# Patient Record
Sex: FEMALE | Race: WHITE | NOT HISPANIC OR LATINO | URBAN - METROPOLITAN AREA
[De-identification: names, ages, dates, MRNs, and addresses within clinical notes are randomized per-mention and may not be internally consistent; named-entity substitution may affect disease eponyms.]

---

## 2017-04-12 PROBLEM — Z00.00 ENCOUNTER FOR PREVENTIVE HEALTH EXAMINATION: Status: ACTIVE | Noted: 2017-04-12

## 2017-05-04 ENCOUNTER — INPATIENT (INPATIENT)
Facility: HOSPITAL | Age: 82
LOS: 4 days | Discharge: NURSING, REHAB OR LONG-TERM CARE | DRG: 329 | End: 2017-05-09
Attending: SURGERY | Admitting: SURGERY
Payer: COMMERCIAL

## 2017-05-04 ENCOUNTER — RESULT REVIEW (OUTPATIENT)
Age: 82
End: 2017-05-04

## 2017-05-04 VITALS
TEMPERATURE: 99 F | HEART RATE: 96 BPM | DIASTOLIC BLOOD PRESSURE: 76 MMHG | OXYGEN SATURATION: 97 % | WEIGHT: 4.41 LBS | SYSTOLIC BLOOD PRESSURE: 149 MMHG | RESPIRATION RATE: 18 BRPM

## 2017-05-04 DIAGNOSIS — Z90.721 ACQUIRED ABSENCE OF OVARIES, UNILATERAL: Chronic | ICD-10-CM

## 2017-05-04 DIAGNOSIS — Z90.49 ACQUIRED ABSENCE OF OTHER SPECIFIED PARTS OF DIGESTIVE TRACT: Chronic | ICD-10-CM

## 2017-05-04 LAB
ALBUMIN SERPL ELPH-MCNC: 3.5 G/DL — SIGNIFICANT CHANGE UP (ref 3.4–5)
ALP SERPL-CCNC: 81 U/L — SIGNIFICANT CHANGE UP (ref 40–120)
ALT FLD-CCNC: 27 U/L — SIGNIFICANT CHANGE UP (ref 12–42)
ANION GAP SERPL CALC-SCNC: 7 MMOL/L — LOW (ref 9–16)
ANION GAP SERPL CALC-SCNC: 9 MMOL/L — SIGNIFICANT CHANGE UP (ref 9–16)
APPEARANCE UR: CLEAR — SIGNIFICANT CHANGE UP
APTT BLD: 29.4 SEC — SIGNIFICANT CHANGE UP (ref 27.5–37.4)
APTT BLD: 31.8 SEC — SIGNIFICANT CHANGE UP (ref 27.5–37.4)
AST SERPL-CCNC: 17 U/L — SIGNIFICANT CHANGE UP (ref 15–37)
BACTERIA # UR AUTO: PRESENT /HPF
BASOPHILS NFR BLD AUTO: 0.4 % — SIGNIFICANT CHANGE UP (ref 0–2)
BILIRUB SERPL-MCNC: 1.1 MG/DL — SIGNIFICANT CHANGE UP (ref 0.2–1.2)
BILIRUB UR-MCNC: NEGATIVE — SIGNIFICANT CHANGE UP
BLD GP AB SCN SERPL QL: NEGATIVE — SIGNIFICANT CHANGE UP
BLD GP AB SCN SERPL QL: NEGATIVE — SIGNIFICANT CHANGE UP
BUN SERPL-MCNC: 14 MG/DL — SIGNIFICANT CHANGE UP (ref 7–23)
BUN SERPL-MCNC: 16 MG/DL — SIGNIFICANT CHANGE UP (ref 7–23)
CALCIUM SERPL-MCNC: 7.9 MG/DL — LOW (ref 8.5–10.5)
CALCIUM SERPL-MCNC: 8.9 MG/DL — SIGNIFICANT CHANGE UP (ref 8.5–10.5)
CHLORIDE SERPL-SCNC: 103 MMOL/L — SIGNIFICANT CHANGE UP (ref 96–108)
CHLORIDE SERPL-SCNC: 105 MMOL/L — SIGNIFICANT CHANGE UP (ref 96–108)
CO2 SERPL-SCNC: 24 MMOL/L — SIGNIFICANT CHANGE UP (ref 22–31)
CO2 SERPL-SCNC: 27 MMOL/L — SIGNIFICANT CHANGE UP (ref 22–31)
COLOR SPEC: YELLOW — SIGNIFICANT CHANGE UP
COMMENT - URINE: SIGNIFICANT CHANGE UP
CREAT SERPL-MCNC: 0.82 MG/DL — SIGNIFICANT CHANGE UP (ref 0.5–1.3)
CREAT SERPL-MCNC: 0.92 MG/DL — SIGNIFICANT CHANGE UP (ref 0.5–1.3)
DIFF PNL FLD: (no result)
EOSINOPHIL NFR BLD AUTO: 2.1 % — SIGNIFICANT CHANGE UP (ref 0–6)
EPI CELLS # UR: (no result) /HPF
GLUCOSE SERPL-MCNC: 109 MG/DL — HIGH (ref 70–99)
GLUCOSE SERPL-MCNC: 158 MG/DL — HIGH (ref 70–99)
GLUCOSE UR QL: NEGATIVE — SIGNIFICANT CHANGE UP
HCT VFR BLD CALC: 43.7 % — SIGNIFICANT CHANGE UP (ref 34.5–45)
HCT VFR BLD CALC: 44 % — SIGNIFICANT CHANGE UP (ref 34.5–45)
HGB BLD-MCNC: 14.2 G/DL — SIGNIFICANT CHANGE UP (ref 11.5–15.5)
HGB BLD-MCNC: 14.7 G/DL — SIGNIFICANT CHANGE UP (ref 11.5–15.5)
INR BLD: 1.14 — SIGNIFICANT CHANGE UP (ref 0.88–1.16)
INR BLD: 1.14 — SIGNIFICANT CHANGE UP (ref 0.88–1.16)
KETONES UR-MCNC: NEGATIVE — SIGNIFICANT CHANGE UP
LEUKOCYTE ESTERASE UR-ACNC: NEGATIVE — SIGNIFICANT CHANGE UP
LIDOCAIN IGE QN: 107 U/L — SIGNIFICANT CHANGE UP (ref 73–393)
LYMPHOCYTES # BLD AUTO: 20 % — SIGNIFICANT CHANGE UP (ref 13–44)
MAGNESIUM SERPL-MCNC: 1.7 MG/DL — SIGNIFICANT CHANGE UP (ref 1.6–2.4)
MCHC RBC-ENTMCNC: 28.9 PG — SIGNIFICANT CHANGE UP (ref 27–34)
MCHC RBC-ENTMCNC: 29.3 PG — SIGNIFICANT CHANGE UP (ref 27–34)
MCHC RBC-ENTMCNC: 32.5 G/DL — SIGNIFICANT CHANGE UP (ref 32–36)
MCHC RBC-ENTMCNC: 33.4 G/DL — SIGNIFICANT CHANGE UP (ref 32–36)
MCV RBC AUTO: 87.8 FL — SIGNIFICANT CHANGE UP (ref 80–100)
MCV RBC AUTO: 88.8 FL — SIGNIFICANT CHANGE UP (ref 80–100)
MONOCYTES NFR BLD AUTO: 9.2 % — SIGNIFICANT CHANGE UP (ref 2–14)
NEUTROPHILS NFR BLD AUTO: 68.3 % — SIGNIFICANT CHANGE UP (ref 43–77)
NITRITE UR-MCNC: NEGATIVE — SIGNIFICANT CHANGE UP
PH UR: 6 — SIGNIFICANT CHANGE UP (ref 5–8)
PHOSPHATE SERPL-MCNC: 3.7 MG/DL — SIGNIFICANT CHANGE UP (ref 2.5–4.5)
PLATELET # BLD AUTO: 284 K/UL — SIGNIFICANT CHANGE UP (ref 150–400)
PLATELET # BLD AUTO: 291 K/UL — SIGNIFICANT CHANGE UP (ref 150–400)
POTASSIUM SERPL-MCNC: 4 MMOL/L — SIGNIFICANT CHANGE UP (ref 3.5–5.3)
POTASSIUM SERPL-MCNC: 4.1 MMOL/L — SIGNIFICANT CHANGE UP (ref 3.5–5.3)
POTASSIUM SERPL-SCNC: 4 MMOL/L — SIGNIFICANT CHANGE UP (ref 3.5–5.3)
POTASSIUM SERPL-SCNC: 4.1 MMOL/L — SIGNIFICANT CHANGE UP (ref 3.5–5.3)
PROT SERPL-MCNC: 7.8 G/DL — SIGNIFICANT CHANGE UP (ref 6.4–8.2)
PROT UR-MCNC: 100 MG/DL
PROTHROM AB SERPL-ACNC: 12.7 SEC — SIGNIFICANT CHANGE UP (ref 9.8–12.7)
PROTHROM AB SERPL-ACNC: 12.7 SEC — SIGNIFICANT CHANGE UP (ref 9.8–12.7)
RBC # BLD: 4.92 M/UL — SIGNIFICANT CHANGE UP (ref 3.8–5.2)
RBC # BLD: 5.01 M/UL — SIGNIFICANT CHANGE UP (ref 3.8–5.2)
RBC # FLD: 14.5 % — SIGNIFICANT CHANGE UP (ref 10.3–16.9)
RBC # FLD: 14.8 % — SIGNIFICANT CHANGE UP (ref 10.3–16.9)
RBC CASTS # UR COMP ASSIST: < 5 /HPF — SIGNIFICANT CHANGE UP
RH IG SCN BLD-IMP: POSITIVE — SIGNIFICANT CHANGE UP
RH IG SCN BLD-IMP: POSITIVE — SIGNIFICANT CHANGE UP
SODIUM SERPL-SCNC: 136 MMOL/L — SIGNIFICANT CHANGE UP (ref 135–145)
SODIUM SERPL-SCNC: 139 MMOL/L — SIGNIFICANT CHANGE UP (ref 135–145)
SP GR SPEC: 1.02 — SIGNIFICANT CHANGE UP (ref 1–1.03)
UROBILINOGEN FLD QL: 0.2 E.U./DL — SIGNIFICANT CHANGE UP
WBC # BLD: 10.7 K/UL — HIGH (ref 3.8–10.5)
WBC # BLD: 13.9 K/UL — HIGH (ref 3.8–10.5)
WBC # FLD AUTO: 10.7 K/UL — HIGH (ref 3.8–10.5)
WBC # FLD AUTO: 13.9 K/UL — HIGH (ref 3.8–10.5)
WBC UR QL: > 10 /HPF

## 2017-05-04 PROCEDURE — 93010 ELECTROCARDIOGRAM REPORT: CPT

## 2017-05-04 PROCEDURE — 74022 RADEX COMPL AQT ABD SERIES: CPT | Mod: 26

## 2017-05-04 PROCEDURE — 71010: CPT | Mod: 26,59

## 2017-05-04 PROCEDURE — 74177 CT ABD & PELVIS W/CONTRAST: CPT | Mod: 26

## 2017-05-04 PROCEDURE — 99291 CRITICAL CARE FIRST HOUR: CPT | Mod: 25

## 2017-05-04 PROCEDURE — 74010: CPT | Mod: 26

## 2017-05-04 RX ORDER — SODIUM CHLORIDE 9 MG/ML
1000 INJECTION INTRAMUSCULAR; INTRAVENOUS; SUBCUTANEOUS
Qty: 0 | Refills: 0 | Status: DISCONTINUED | OUTPATIENT
Start: 2017-05-04 | End: 2017-05-04

## 2017-05-04 RX ORDER — DEXTROSE 50 % IN WATER 50 %
1 SYRINGE (ML) INTRAVENOUS ONCE
Qty: 0 | Refills: 0 | Status: DISCONTINUED | OUTPATIENT
Start: 2017-05-04 | End: 2017-05-05

## 2017-05-04 RX ORDER — HEPARIN SODIUM 5000 [USP'U]/ML
7500 INJECTION INTRAVENOUS; SUBCUTANEOUS EVERY 8 HOURS
Qty: 0 | Refills: 0 | Status: DISCONTINUED | OUTPATIENT
Start: 2017-05-04 | End: 2017-05-04

## 2017-05-04 RX ORDER — SODIUM CHLORIDE 9 MG/ML
500 INJECTION INTRAMUSCULAR; INTRAVENOUS; SUBCUTANEOUS ONCE
Qty: 0 | Refills: 0 | Status: COMPLETED | OUTPATIENT
Start: 2017-05-04 | End: 2017-05-05

## 2017-05-04 RX ORDER — CEFOTETAN DISODIUM 1 G
2 VIAL (EA) INJECTION EVERY 12 HOURS
Qty: 0 | Refills: 0 | Status: DISCONTINUED | OUTPATIENT
Start: 2017-05-04 | End: 2017-05-06

## 2017-05-04 RX ORDER — SODIUM CHLORIDE 9 MG/ML
1000 INJECTION, SOLUTION INTRAVENOUS
Qty: 0 | Refills: 0 | Status: DISCONTINUED | OUTPATIENT
Start: 2017-05-04 | End: 2017-05-05

## 2017-05-04 RX ORDER — GLUCAGON INJECTION, SOLUTION 0.5 MG/.1ML
1 INJECTION, SOLUTION SUBCUTANEOUS ONCE
Qty: 0 | Refills: 0 | Status: DISCONTINUED | OUTPATIENT
Start: 2017-05-04 | End: 2017-05-05

## 2017-05-04 RX ORDER — CLONAZEPAM 1 MG
0 TABLET ORAL
Qty: 0 | Refills: 0 | COMMUNITY

## 2017-05-04 RX ORDER — HYDROMORPHONE HYDROCHLORIDE 2 MG/ML
0.5 INJECTION INTRAMUSCULAR; INTRAVENOUS; SUBCUTANEOUS EVERY 4 HOURS
Qty: 0 | Refills: 0 | Status: DISCONTINUED | OUTPATIENT
Start: 2017-05-04 | End: 2017-05-04

## 2017-05-04 RX ORDER — HYDROMORPHONE HYDROCHLORIDE 2 MG/ML
30 INJECTION INTRAMUSCULAR; INTRAVENOUS; SUBCUTANEOUS
Qty: 0 | Refills: 0 | Status: DISCONTINUED | OUTPATIENT
Start: 2017-05-04 | End: 2017-05-07

## 2017-05-04 RX ORDER — ONDANSETRON 8 MG/1
4 TABLET, FILM COATED ORAL EVERY 6 HOURS
Qty: 0 | Refills: 0 | Status: DISCONTINUED | OUTPATIENT
Start: 2017-05-04 | End: 2017-05-04

## 2017-05-04 RX ORDER — HYDROMORPHONE HYDROCHLORIDE 2 MG/ML
0.5 INJECTION INTRAMUSCULAR; INTRAVENOUS; SUBCUTANEOUS ONCE
Qty: 0 | Refills: 0 | Status: DISCONTINUED | OUTPATIENT
Start: 2017-05-04 | End: 2017-05-04

## 2017-05-04 RX ORDER — DULOXETINE HYDROCHLORIDE 30 MG/1
1 CAPSULE, DELAYED RELEASE ORAL
Qty: 0 | Refills: 0 | COMMUNITY

## 2017-05-04 RX ORDER — INSULIN LISPRO 100/ML
VIAL (ML) SUBCUTANEOUS
Qty: 0 | Refills: 0 | Status: DISCONTINUED | OUTPATIENT
Start: 2017-05-04 | End: 2017-05-05

## 2017-05-04 RX ORDER — SODIUM CHLORIDE 9 MG/ML
1000 INJECTION, SOLUTION INTRAVENOUS
Qty: 0 | Refills: 0 | Status: DISCONTINUED | OUTPATIENT
Start: 2017-05-04 | End: 2017-05-04

## 2017-05-04 RX ORDER — IOHEXOL 300 MG/ML
50 INJECTION, SOLUTION INTRAVENOUS ONCE
Qty: 0 | Refills: 0 | Status: COMPLETED | OUTPATIENT
Start: 2017-05-04 | End: 2017-05-04

## 2017-05-04 RX ORDER — ONDANSETRON 8 MG/1
4 TABLET, FILM COATED ORAL ONCE
Qty: 0 | Refills: 0 | Status: COMPLETED | OUTPATIENT
Start: 2017-05-04 | End: 2017-05-04

## 2017-05-04 RX ORDER — DEXTROSE 50 % IN WATER 50 %
25 SYRINGE (ML) INTRAVENOUS ONCE
Qty: 0 | Refills: 0 | Status: DISCONTINUED | OUTPATIENT
Start: 2017-05-04 | End: 2017-05-05

## 2017-05-04 RX ORDER — MAGNESIUM SULFATE 500 MG/ML
2 VIAL (ML) INJECTION ONCE
Qty: 0 | Refills: 0 | Status: COMPLETED | OUTPATIENT
Start: 2017-05-04 | End: 2017-05-04

## 2017-05-04 RX ORDER — SODIUM CHLORIDE 9 MG/ML
500 INJECTION INTRAMUSCULAR; INTRAVENOUS; SUBCUTANEOUS ONCE
Qty: 0 | Refills: 0 | Status: COMPLETED | OUTPATIENT
Start: 2017-05-04 | End: 2017-05-04

## 2017-05-04 RX ORDER — HYDROMORPHONE HYDROCHLORIDE 2 MG/ML
1 INJECTION INTRAMUSCULAR; INTRAVENOUS; SUBCUTANEOUS EVERY 4 HOURS
Qty: 0 | Refills: 0 | Status: DISCONTINUED | OUTPATIENT
Start: 2017-05-04 | End: 2017-05-04

## 2017-05-04 RX ORDER — HEPARIN SODIUM 5000 [USP'U]/ML
7500 INJECTION INTRAVENOUS; SUBCUTANEOUS EVERY 8 HOURS
Qty: 0 | Refills: 0 | Status: DISCONTINUED | OUTPATIENT
Start: 2017-05-04 | End: 2017-05-09

## 2017-05-04 RX ORDER — HEPARIN SODIUM 5000 [USP'U]/ML
5000 INJECTION INTRAVENOUS; SUBCUTANEOUS EVERY 8 HOURS
Qty: 0 | Refills: 0 | Status: DISCONTINUED | OUTPATIENT
Start: 2017-05-04 | End: 2017-05-04

## 2017-05-04 RX ORDER — ATORVASTATIN CALCIUM 80 MG/1
0 TABLET, FILM COATED ORAL
Qty: 0 | Refills: 0 | COMMUNITY

## 2017-05-04 RX ORDER — ATORVASTATIN CALCIUM 80 MG/1
10 TABLET, FILM COATED ORAL
Qty: 0 | Refills: 0 | COMMUNITY

## 2017-05-04 RX ORDER — DEXTROSE 50 % IN WATER 50 %
12.5 SYRINGE (ML) INTRAVENOUS ONCE
Qty: 0 | Refills: 0 | Status: DISCONTINUED | OUTPATIENT
Start: 2017-05-04 | End: 2017-05-05

## 2017-05-04 RX ADMIN — SODIUM CHLORIDE 1000 MILLILITER(S): 9 INJECTION INTRAMUSCULAR; INTRAVENOUS; SUBCUTANEOUS at 21:30

## 2017-05-04 RX ADMIN — HYDROMORPHONE HYDROCHLORIDE 30 MILLILITER(S): 2 INJECTION INTRAMUSCULAR; INTRAVENOUS; SUBCUTANEOUS at 19:50

## 2017-05-04 RX ADMIN — IOHEXOL 50 MILLILITER(S): 300 INJECTION, SOLUTION INTRAVENOUS at 09:33

## 2017-05-04 RX ADMIN — HYDROMORPHONE HYDROCHLORIDE 0.5 MILLIGRAM(S): 2 INJECTION INTRAMUSCULAR; INTRAVENOUS; SUBCUTANEOUS at 18:22

## 2017-05-04 RX ADMIN — HYDROMORPHONE HYDROCHLORIDE 0.5 MILLIGRAM(S): 2 INJECTION INTRAMUSCULAR; INTRAVENOUS; SUBCUTANEOUS at 18:33

## 2017-05-04 RX ADMIN — SODIUM CHLORIDE 150 MILLILITER(S): 9 INJECTION, SOLUTION INTRAVENOUS at 19:17

## 2017-05-04 RX ADMIN — ONDANSETRON 4 MILLIGRAM(S): 8 TABLET, FILM COATED ORAL at 09:32

## 2017-05-04 RX ADMIN — Medication 50 GRAM(S): at 20:46

## 2017-05-04 RX ADMIN — SODIUM CHLORIDE 200 MILLILITER(S): 9 INJECTION INTRAMUSCULAR; INTRAVENOUS; SUBCUTANEOUS at 09:33

## 2017-05-04 NOTE — ED PROVIDER NOTE - PHYSICAL EXAMINATION
CONSTITUTIONAL: Well-appearing; well-nourished; in no apparent distress.   HEAD: Normocephalic; atraumatic.   EYES: PERRL; EOM intact; conjunctiva and sclera clear  ENT: normal nose; no rhinorrhea; MMM.   NECK: Supple; non-tender;   CARDIOVASCULAR: Normal S1, S2; no murmurs, rubs, or gallops. Regular rate and rhythm.   RESPIRATORY: Breathing easily; breath sounds clear and equal bilaterally; no wheezes, rhonchi, or rales.  GI: Soft; distended; diffuse TTP without focal R/G, + hyperactive BS   EXT: No cyanosis or edema; N/V intact  SKIN: Normal for age and race; warm; dry; good turgor.   NEURO: A & O x 3; face symmetric; grossly unremarkable.   PSYCHOLOGICAL: The patient’s mood and manner are appropriate.

## 2017-05-04 NOTE — H&P ADULT - NSHPPHYSICALEXAM_GEN_ALL_CORE
PHYSICAL EXAM:    Constitutional: Alert and conversant in NAD, obese    Respiratory: Clear to auscultation    Cardiovascular: RRR    Gastrointestinal: softly distended, ttp in RLQ, no rebound/guarding. Umbilical hernia reducible.    Extremities: spider veins, equal bilaterally

## 2017-05-04 NOTE — ED ADULT NURSE NOTE - OBJECTIVE STATEMENT
"The pain is all over my abdomen, but more on the R side since 2 days ago but yesterday is so painful I took Tylenol" as said by the patient.  a&o x 3, communicative, (-) headache and N/V/D, last bowel movement yesterday. Abdomen is soft and lax, full equal radial pulses on both arms. abdomen is soft and lax, tender on the R side. "The pain is all over my abdomen, but more on the R side since 2 days ago but yesterday is so painful I took Tylenol" as said by the patient.  a&o x 3, communicative, (-) headache and N/V/D, last bowel movement yesterday. Abdomen is soft and lax, full equal radial pulses on both arms. abdomen is soft and lax, tender on the R side. will continue to monitor.

## 2017-05-04 NOTE — ED PROVIDER NOTE - MEDICAL DECISION MAKING DETAILS
Pt with AFL on abdominal xray surgery then consulted due to concern for SBO, discussed with Dr. Bergman. Pt underwent CT and surgery indicates needs to go to OR emergently. Continued on fluid and will admit at this time.

## 2017-05-04 NOTE — ED PROVIDER NOTE - CRITICAL CARE PROVIDED
additional history taking/direct patient care (not related to procedure)/consultation with other physicians/interpretation of diagnostic studies/documentation

## 2017-05-04 NOTE — ED PROVIDER NOTE - DIAGNOSTIC INTERPRETATION
ER Physician: Mihir Carranza  ABDOMINAL XRAY INTERPRETATION:  + AFL, no free air noted, no apparent hepatomegaly

## 2017-05-04 NOTE — ED PROVIDER NOTE - OBJECTIVE STATEMENT
81yo woman with c/o abdominal pain for the past 2 days. Pt notes generalized dull aching pain throughout her abdomen without radiation. Started 2 days prior. Notes associated nausea without vomiting. Had loose BM yesterday and felt slight relief. Now states not passing gas. Previous h/o cholecystectomy and oophorectomy remotely. no known h/o SBO. Pt denies F/C, no CP nor SOB.

## 2017-05-04 NOTE — H&P ADULT - HISTORY OF PRESENT ILLNESS
83 yo obese F with hyperlipidemia and depression PSH of cholecystectomy and oophorectomy presents with 2 days of crampy RLQ abdominal pain, nausea and decreased bowel function. She was having normal bowel movements in the morning Tuesday and Wednesday and then diarrhea in the afternoon both days. Her last BM was yesterday and she's no longer passing flatus. She has had anorexia. She has nausea but is not vomiting. No fevers or chills. Is still able to ambulate. No decrease in caliber of bms or blood in stool.

## 2017-05-04 NOTE — ED ADULT NURSE REASSESSMENT NOTE - NS ED NURSE REASSESS COMMENT FT1
Pt has returned from X ray in stable condition. Pt is drinking Omnipaque and tolerating PO at this time. Plan of care has been explained to pt and pt verbalized understanding. Will continue monitoring.

## 2017-05-04 NOTE — H&P ADULT - NSHPLABSRESULTS_GEN_ALL_CORE
14.2   10.7  )-----------( 284      ( 04 May 2017 09:13 )             43.7   04 May 2017 09:13    139    |  105    |  14     ----------------------------<  109    4.1     |  27     |  0.82     Ca    8.9        04 May 2017 09:13    TPro  7.8    /  Alb  3.5    /  TBili  1.1    /  DBili  x      /  AST  17     /  ALT  27     /  AlkPhos  81     04 May 2017 09:13      EXAM:  CT ABDOMEN AND PELVIS OC IC                          PROCEDURE DATE:  05/04/2017    Quantity of Contrast in Vial in ml: 100 Contrast Used: Optiray 350  Quantity of Contrast Wasted in ml: 5           INTERPRETATION:  CT of the abdomen and pelvis with contrast dated   5/4/2017 11:34 AM    INDICATION: abdominal pain , rule out obstruction.    TECHNIQUE: CT of the abdomen and pelvis was performed using oral and   intravenous contrast.  Axial and coronal images were produced and   reviewed.    PRIOR STUDIES: None.    FINDINGS: Images of the lower chest demonstrate aortic valve and coronary   artery calcifications.    The liver is normal in appearance.  Status post cholecystectomy. The CBD   measures 1.6 cm in diameter, more dilated than expected   postcholecystectomy. 1.4 cm nodular bulge in the major duodenal papilla,   the pancreas appears otherwise unremarkable.  No splenic abnormalities   are seen.    The adrenal glands are unremarkable. Focal left lower pole renal scar.   The kidneys are otherwise normal in appearance.        No abdominal aortic aneurysm is seen. There are moderate atherosclerotic   ossification throughout the abdominal aorta and its branches. 1.7 cm   ectasia of the right common iliac artery, 1.6 cm ectasia of the left   common iliac artery. Marked tortuosity of the infrarenal abdominal aorta   and common iliac arteries. No lymphadenopathy is seen.     Evaluation of the bowel demonstrates dilated inverted bean-shaped cecum.   In the midabdomen measuring up to 10.9 cm in diameter. There is twisting   of the ileocecal mesentery and loops of distal small bowel seen best on   coronal images 33 through 40. The colon distal to the cecum is partially   collapsed. Oral contrast has reached the distal small bowel. No free   intraperitoneal air. Small fat-containing umbilical hernia with mild   stranding of the herniated fat. Oral contrast in distal esophagus   suggesting gastroesophageal reflux. No ascites is seen.    Images of the pelvis demonstrate the urinary bladder, uterus and adnexae   to be normal in appearance.   No pelvic adenopathy.    Evaluation of the osseous structures demonstrates severe degenerative   changes throughout the spine. Levoscoliosis. Extensive calcifications   abutting the anterosuperior iliac spines. There are mild degenerative   changes of the hip joints.    IMPRESSION:  Cecal volvulus.    Small fat-containing umbilical hernia with stranding of the herniated   fat. Correlation with physical exam is recommended to ensure it is   reducible.    Dilated CBD. Nodular bulge in the major duodenal papilla which may be   normal variant, however correlation with MRI or pancreatic mass protocol   is recommended to exclude a small ampullary or periampullary neoplasm.    Discussed with Dr. Carranza at 11:57 AM on 5/4/2017.            "Thank you for the opportunity to participate in the care of this   patient."        ENEDINA ZHOU M.D., ATTENDING RADIOLOGIST  This document has been electronically signed. May  4 2017 12:02PM

## 2017-05-04 NOTE — H&P ADULT - ASSESSMENT
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 Herrera  -NPO/IVF  -Pain/nausea control  -SCD/SQH  -Pre-op

## 2017-05-04 NOTE — CONSULT NOTE ADULT - ASSESSMENT
83 YO F with cecal volvulus s/p R hemicolectomy    Neuro: Dilaudid PCA  CV: HD stable  Pulm: Face mask, incentive spirometry  FENGI: NPO, LR@150, zofran, NGT MICHAEL  : Izabel  Endo: ISS  ID: Cefotetan (5/4-)  Heme: SCD, SQH  Lines: PIV  Wounds: Ex lap

## 2017-05-04 NOTE — PROGRESS NOTE ADULT - SUBJECTIVE AND OBJECTIVE BOX
Admittimg note woman sent to the ER from my office where she presented this morning with h/o having of having had pain since 2das Came on sudden y on the evening of the second, was widwspred over he abdomen sharpwas unable to sleep. Next day saw her chiropractor After she had a loose Bowel movement she felt better Pain persisted so she came to see me today Has not vomited, not nauseous but without appetiite  In mild pain, mostly in R upper quadrant    She is usually in good health, working as a teacher  Has long standing H/O depression, is recooverd alcoholic has GERD hyperlipidemial  Takes lipitor, cymbalta and dexalent  is allergic to sulphas and ampicillin  Had cholecystectomy in 1986bil oophorectomy in 2009  On P/E not in acute distress, pulse 80 /min reg /76 tongue moist, not coated  ESM at base and LSB  lungs clear   Abdomen markedly distended and obese, tender to palpation L UQ and Lside of unbilicus where she has a hernia  no discrete mass or viscus palpable BS not increased    periphery unremarkable    Suspect subacute MILLY  in ER xray of abdomen revealed air fluid levels, CT of abdomen shows volvulus of cecum  Surgical consult to be called  Conservative treatment to be initiated initially

## 2017-05-04 NOTE — ED ADULT NURSE REASSESSMENT NOTE - NS ED NURSE REASSESS COMMENT FT1
Pt has returned from CT in stable condition and resting on bed comfortably. Pending for CT result. Plan of care has been explained to pt and pt verbalized understanding. Will continue monitoring.

## 2017-05-05 LAB
ANION GAP SERPL CALC-SCNC: 8 MMOL/L — LOW (ref 9–16)
BUN SERPL-MCNC: 15 MG/DL — SIGNIFICANT CHANGE UP (ref 7–23)
CALCIUM SERPL-MCNC: 8.1 MG/DL — LOW (ref 8.5–10.5)
CHLORIDE SERPL-SCNC: 104 MMOL/L — SIGNIFICANT CHANGE UP (ref 96–108)
CO2 SERPL-SCNC: 26 MMOL/L — SIGNIFICANT CHANGE UP (ref 22–31)
CREAT SERPL-MCNC: 0.85 MG/DL — SIGNIFICANT CHANGE UP (ref 0.5–1.3)
GLUCOSE SERPL-MCNC: 139 MG/DL — HIGH (ref 70–99)
HBA1C BLD-MCNC: 5.7 % — HIGH (ref 4.8–5.6)
HCT VFR BLD CALC: 40.3 % — SIGNIFICANT CHANGE UP (ref 34.5–45)
HGB BLD-MCNC: 13.1 G/DL — SIGNIFICANT CHANGE UP (ref 11.5–15.5)
INR BLD: 1.22 — HIGH (ref 0.88–1.16)
MAGNESIUM SERPL-MCNC: 2.5 MG/DL — HIGH (ref 1.6–2.4)
MCHC RBC-ENTMCNC: 28.9 PG — SIGNIFICANT CHANGE UP (ref 27–34)
MCHC RBC-ENTMCNC: 32.5 G/DL — SIGNIFICANT CHANGE UP (ref 32–36)
MCV RBC AUTO: 89 FL — SIGNIFICANT CHANGE UP (ref 80–100)
PHOSPHATE SERPL-MCNC: 4.2 MG/DL — SIGNIFICANT CHANGE UP (ref 2.5–4.5)
PLATELET # BLD AUTO: 262 K/UL — SIGNIFICANT CHANGE UP (ref 150–400)
POTASSIUM SERPL-MCNC: 4.4 MMOL/L — SIGNIFICANT CHANGE UP (ref 3.5–5.3)
POTASSIUM SERPL-SCNC: 4.4 MMOL/L — SIGNIFICANT CHANGE UP (ref 3.5–5.3)
PROTHROM AB SERPL-ACNC: 13.6 SEC — HIGH (ref 9.8–12.7)
RBC # BLD: 4.53 M/UL — SIGNIFICANT CHANGE UP (ref 3.8–5.2)
RBC # FLD: 14.7 % — SIGNIFICANT CHANGE UP (ref 10.3–16.9)
SODIUM SERPL-SCNC: 138 MMOL/L — SIGNIFICANT CHANGE UP (ref 135–145)
WBC # BLD: 11.1 K/UL — HIGH (ref 3.8–10.5)
WBC # FLD AUTO: 11.1 K/UL — HIGH (ref 3.8–10.5)

## 2017-05-05 RX ORDER — ACETAMINOPHEN 500 MG
1000 TABLET ORAL ONCE
Qty: 0 | Refills: 0 | Status: COMPLETED | OUTPATIENT
Start: 2017-05-05 | End: 2017-05-05

## 2017-05-05 RX ORDER — SODIUM CHLORIDE 9 MG/ML
1000 INJECTION, SOLUTION INTRAVENOUS
Qty: 0 | Refills: 0 | Status: DISCONTINUED | OUTPATIENT
Start: 2017-05-05 | End: 2017-05-07

## 2017-05-05 RX ADMIN — HEPARIN SODIUM 7500 UNIT(S): 5000 INJECTION INTRAVENOUS; SUBCUTANEOUS at 05:52

## 2017-05-05 RX ADMIN — SODIUM CHLORIDE 1000 MILLILITER(S): 9 INJECTION INTRAMUSCULAR; INTRAVENOUS; SUBCUTANEOUS at 00:00

## 2017-05-05 RX ADMIN — Medication 400 MILLIGRAM(S): at 18:38

## 2017-05-05 RX ADMIN — Medication 100 GRAM(S): at 17:09

## 2017-05-05 RX ADMIN — HEPARIN SODIUM 7500 UNIT(S): 5000 INJECTION INTRAVENOUS; SUBCUTANEOUS at 14:04

## 2017-05-05 RX ADMIN — Medication 100 GRAM(S): at 05:52

## 2017-05-05 RX ADMIN — HEPARIN SODIUM 7500 UNIT(S): 5000 INJECTION INTRAVENOUS; SUBCUTANEOUS at 22:18

## 2017-05-05 NOTE — PROGRESS NOTE ADULT - SUBJECTIVE AND OBJECTIVE BOX
INTERVAL HPI/OVERNIGHT EVENTS:    SURGERY ATTENDING    STATUS POST:  EXPLORATORY LAPAROTOMY, EXTENSIVE LYSIS OF ADHESIONS, REDUCTION OF INTERNAL HERNIA, RIGHT HEMICOLECTOMY WITH TERMINAL ILEUM RESECTION WITH ILEOCOLONIC ANASTOMOSIS, DECOMPRESSION OF SMALL AND LARGE BOWEL, REPAIR OF UMBILICAL/VENTRAL HERNIA WITH MYOFASCIAL FLAPS BILATERALLY NO MESH FOR CECAL VOLVULUS, MULTIPLE ADHESIONS, INTERNAL AND VENTRAL/UMBILICAL HERNIA.       POST OPERATIVE DAY #: 1    SUBJECTIVE:  Flatus: [ ] YES [X ] NO             Bowel Movement: [ ] YES [X ] NO  Pain (0-10):      3      Pain Control Adequate: [X ] YES [ ] NO  Nausea: [ ] YES [X ] NO            Vomiting: [ ] YES [X ] NO  Diarrhea: [ ] YES [X ] NO         Constipation: [ ] YES [X ] NO     Chest Pain: [ ] YES [X ] NO    SOB:  [ ] YES [X ] NO    MEDICATIONS  (STANDING):  cefoTEtan  IVPB 2Gram(s) IV Intermittent every 12 hours  HYDROmorphone PCA (1 mG/mL) 30milliLiter(s) PCA Continuous PCA Continuous  heparin  Injectable 7500Unit(s) SubCutaneous every 8 hours  lactated ringers. 1000milliLiter(s) IV Continuous <Continuous>    MEDICATIONS  (PRN):      Vital Signs Last 24 Hrs  T(C): 37.8, Max: 37.8 ( @ 14:54)  T(F): 100.1, Max: 100.1 (- @ 14:54)  HR: 100 (74 - 100)  BP: 150/64 (116/56 - 150/64)  BP(mean): 94 (74 - 94)  RR: 14 (6 - 67)  SpO2: 95% (90% - 96%)            I&O's Detail  I & Os for 24h ending 05 May 2017 07:00  =============================================  IN:    lactated ringers.: 1350 ml    lactated ringers.: 800 ml    Sodium Chloride 0.9% IV Bolus: 500 ml    IV PiggyBack: 100 ml    Total IN: 2750 ml  ---------------------------------------------  OUT:    Indwelling Catheter - Urethral: 510 ml    Nasoenteral Tube: 250 ml    Total OUT: 760 ml  ---------------------------------------------  Total NET: 1990 ml    I & Os for current day (as of 05 May 2017 16:16)  =============================================  IN:    lactated ringers.: 200 ml    Total IN: 200 ml  ---------------------------------------------  OUT:    Indwelling Catheter - Urethral: 445 ml    Nasoenteral Tube: 100 ml    Total OUT: 545 ml  ---------------------------------------------  Total NET: -345 ml      LABS:                        13.1   11.1  )-----------( 262      ( 05 May 2017 05:36 )             40.3     05-05    138  |  104  |  15  ----------------------------<  139<H>  4.4   |  26  |  0.85    Ca    8.1<L>      05 May 2017 05:36  Phos  4.2     05-05  Mg     2.5     05-05    TPro  7.8  /  Alb  3.5  /  TBili  1.1  /  DBili  x   /  AST  17  /  ALT  27  /  AlkPhos  81  05-04    PT/INR - ( 05 May 2017 05:36 )   PT: 13.6 sec;   INR: 1.22          PTT - ( 04 May 2017 17:33 )  PTT:29.4 sec  Urinalysis Basic - ( 04 May 2017 09:02 )    Color: Yellow / Appearance: Clear / S.025 / pH: x  Gluc: x / Ketone: NEGATIVE  / Bili: NEGATIVE / Urobili: 0.2 E.U./dL   Blood: x / Protein: 100 mg/dL / Nitrite: NEGATIVE   Leuk Esterase: NEGATIVE / RBC: < 5 /HPF / WBC > 10 /HPF   Sq Epi: x / Non Sq Epi: Many /HPF / Bacteria: Present /HPF        RADIOLOGY & ADDITIONAL STUDIES:

## 2017-05-05 NOTE — CHART NOTE - NSCHARTNOTEFT_GEN_A_CORE
Upon Nutritional Assessment by the Registered Dietitian your patient was determined to meet criteria / has evidence of the following diagnosis/diagnoses:          [ ]  Mild Protein Calorie Malnutrition        [ ]  Moderate Protein Calorie Malnutrition        [ ] Severe Protein Calorie Malnutrition        [ ] Unspecified Protein Calorie Malnutrition        [ ] Underweight / BMI <19        [ X] Morbid Obesity / BMI > 40      Findings as based on:  •  Comprehensive nutrition assessment and consultation        Treatment:    The following diet has been recommended:  Clears > low residue as medically feasible     PROVIDER Section:     By signing this assessment you are acknowledging and agree with the diagnosis/diagnoses assigned by the Registered Dietitian    Comments:     Please see full nutrition note from 5/5

## 2017-05-05 NOTE — PROGRESS NOTE ADULT - ASSESSMENT
ABD DISTENDED, BS-, BM-, FLATUS-, WOUND CLEAN, INTACT.  TRANSFER OUT OF SICU, NPO, IVF, IV ABX, DVT PROFILAXIS, SPIROMETRY, OOB TO CHAIR.

## 2017-05-05 NOTE — PROGRESS NOTE ADULT - SUBJECTIVE AND OBJECTIVE BOX
underwent emergency surgery on the 4th right hemicolectomy  ll was alert and comfortable when seen last evening hemodynamically stable not short of breath  This morning is having some discomfort around umbilicus, pulse 90/min /58  temp 37.3  electrolytes normral, wbc 11,thousand

## 2017-05-05 NOTE — DIETITIAN INITIAL EVALUATION ADULT. - OTHER INFO
Pt is a 81y/o woman s/p R hemicolectomy for cecal volvulus. Pt currently c/o 5/10 abdominal pain. Pt currently w/ NGT to drainage. No N/V/D/C. Pt states her appetite was poor and she had decreased bowl fxn PTA. Pt denies chewing/swallowing issues, recent wt changes, or food allergies. Skin intact w/ ASW. RD provided edu on anticipated nutrition plan of care. Pt verbalized understanding.

## 2017-05-05 NOTE — PROGRESS NOTE ADULT - SUBJECTIVE AND OBJECTIVE BOX
Overnight Events: Given 1L bolus for low UOP. Otherwise, pain well controlled. Patient instructed on incentive spirometer and getting out of bed to chair.     Vital Signs Last 24 Hrs  T(C): 37.3, Max: 37.4 (05-04 @ 17:16)  T(F): 99.1, Max: 99.3 (05-04 @ 17:16)  HR: 96 (74 - 98)  BP: 122/59 (116/56 - 158/90)  BP(mean): 89 (74 - 94)  RR: 8 (8 - 19)  SpO2: 93% (93% - 98%)  I & Os for 24h ending 05 May 2017 07:00  =============================================  IN:    lactated ringers.: 1350 ml    lactated ringers.: 800 ml    Sodium Chloride 0.9% IV Bolus: 500 ml    IV PiggyBack: 100 ml    Total IN: 2750 ml  ---------------------------------------------  OUT:    Indwelling Catheter - Urethral: 510 ml    Nasoenteral Tube: 250 ml    Total OUT: 760 ml  ---------------------------------------------  Total NET: 1990 ml    I & Os for current day (as of 05 May 2017 08:00)  =============================================  IN:    lactated ringers.: 200 ml    Total IN: 200 ml  ---------------------------------------------  OUT:    Indwelling Catheter - Urethral: 125 ml    Total OUT: 125 ml  ---------------------------------------------  Total NET: 75 ml    PHYSICAL EXAM:    Constitutional: Alert and conversant in NAD    Respiratory: Some secretions audible upper airway    Cardiovascular: RRR    Gastrointestinal: soft, appropriately ttp, dressing clean    Extremities: warm, well perfused                              13.1   11.1  )-----------( 262      ( 05 May 2017 05:36 )             40.3   05 May 2017 05:36    138    |  104    |  15     ----------------------------<  139    4.4     |  26     |  0.85     Ca    8.1        05 May 2017 05:36  Phos  3.7       04 May 2017 17:33  Mg     1.7       04 May 2017 17:33    TPro  7.8    /  Alb  3.5    /  TBili  1.1    /  DBili  x      /  AST  17     /  ALT  27     /  AlkPhos  81     04 May 2017 09:13  PT/INR - ( 05 May 2017 05:36 )   PT: 13.6 sec;   INR: 1.22          PTT - ( 04 May 2017 17:33 )  PTT:29.4 sec

## 2017-05-05 NOTE — PROGRESS NOTE ADULT - ASSESSMENT
83 YO F with cecal volvulus s/p R hemicolectomy    Neuro: Dilaudid PCA  CV: HD stable  Pulm: Face mask, incentive spirometry  FENGI: NPO, LR@200, zofran, NGT MICHAEL  : Izabel  Endo: ISS  ID: Cefotetan (5/4-)  Heme: SCD, SQH  Lines: PIV  Wounds: Ex lap

## 2017-05-06 LAB
ANION GAP SERPL CALC-SCNC: 6 MMOL/L — LOW (ref 9–16)
APPEARANCE UR: CLEAR — SIGNIFICANT CHANGE UP
BILIRUB UR-MCNC: NEGATIVE — SIGNIFICANT CHANGE UP
BUN SERPL-MCNC: 12 MG/DL — SIGNIFICANT CHANGE UP (ref 7–23)
CALCIUM SERPL-MCNC: 8.5 MG/DL — SIGNIFICANT CHANGE UP (ref 8.5–10.5)
CHLORIDE SERPL-SCNC: 104 MMOL/L — SIGNIFICANT CHANGE UP (ref 96–108)
CO2 SERPL-SCNC: 28 MMOL/L — SIGNIFICANT CHANGE UP (ref 22–31)
COLOR SPEC: YELLOW — SIGNIFICANT CHANGE UP
CREAT SERPL-MCNC: 0.8 MG/DL — SIGNIFICANT CHANGE UP (ref 0.5–1.3)
DIFF PNL FLD: (no result)
GLUCOSE SERPL-MCNC: 105 MG/DL — HIGH (ref 70–99)
GLUCOSE UR QL: NEGATIVE — SIGNIFICANT CHANGE UP
HCT VFR BLD CALC: 33.3 % — LOW (ref 34.5–45)
HCT VFR BLD CALC: 34 % — LOW (ref 34.5–45)
HGB BLD-MCNC: 10.8 G/DL — LOW (ref 11.5–15.5)
HGB BLD-MCNC: 10.8 G/DL — LOW (ref 11.5–15.5)
KETONES UR-MCNC: NEGATIVE — SIGNIFICANT CHANGE UP
LEUKOCYTE ESTERASE UR-ACNC: (no result)
MAGNESIUM SERPL-MCNC: 2.2 MG/DL — SIGNIFICANT CHANGE UP (ref 1.6–2.4)
MCHC RBC-ENTMCNC: 29.2 PG — SIGNIFICANT CHANGE UP (ref 27–34)
MCHC RBC-ENTMCNC: 29.6 PG — SIGNIFICANT CHANGE UP (ref 27–34)
MCHC RBC-ENTMCNC: 31.8 G/DL — LOW (ref 32–36)
MCHC RBC-ENTMCNC: 32.4 G/DL — SIGNIFICANT CHANGE UP (ref 32–36)
MCV RBC AUTO: 91.2 FL — SIGNIFICANT CHANGE UP (ref 80–100)
MCV RBC AUTO: 91.9 FL — SIGNIFICANT CHANGE UP (ref 80–100)
NITRITE UR-MCNC: NEGATIVE — SIGNIFICANT CHANGE UP
PH UR: 6 — SIGNIFICANT CHANGE UP (ref 5–8)
PHOSPHATE SERPL-MCNC: 1.9 MG/DL — LOW (ref 2.5–4.5)
PLATELET # BLD AUTO: 226 K/UL — SIGNIFICANT CHANGE UP (ref 150–400)
PLATELET # BLD AUTO: 240 K/UL — SIGNIFICANT CHANGE UP (ref 150–400)
POTASSIUM SERPL-MCNC: 4.1 MMOL/L — SIGNIFICANT CHANGE UP (ref 3.5–5.3)
POTASSIUM SERPL-SCNC: 4.1 MMOL/L — SIGNIFICANT CHANGE UP (ref 3.5–5.3)
PROT UR-MCNC: NEGATIVE MG/DL — SIGNIFICANT CHANGE UP
RBC # BLD: 3.65 M/UL — LOW (ref 3.8–5.2)
RBC # BLD: 3.7 M/UL — LOW (ref 3.8–5.2)
RBC # FLD: 14.6 % — SIGNIFICANT CHANGE UP (ref 10.3–16.9)
RBC # FLD: 14.6 % — SIGNIFICANT CHANGE UP (ref 10.3–16.9)
SODIUM SERPL-SCNC: 138 MMOL/L — SIGNIFICANT CHANGE UP (ref 135–145)
SP GR SPEC: 1.02 — SIGNIFICANT CHANGE UP (ref 1–1.03)
UROBILINOGEN FLD QL: 0.2 E.U./DL — SIGNIFICANT CHANGE UP
WBC # BLD: 9.1 K/UL — SIGNIFICANT CHANGE UP (ref 3.8–10.5)
WBC # BLD: 9.4 K/UL — SIGNIFICANT CHANGE UP (ref 3.8–10.5)
WBC # FLD AUTO: 9.1 K/UL — SIGNIFICANT CHANGE UP (ref 3.8–10.5)
WBC # FLD AUTO: 9.4 K/UL — SIGNIFICANT CHANGE UP (ref 3.8–10.5)

## 2017-05-06 PROCEDURE — 71010: CPT | Mod: 26

## 2017-05-06 RX ORDER — ONDANSETRON 8 MG/1
4 TABLET, FILM COATED ORAL EVERY 8 HOURS
Qty: 0 | Refills: 0 | Status: DISCONTINUED | OUTPATIENT
Start: 2017-05-06 | End: 2017-05-09

## 2017-05-06 RX ORDER — ONDANSETRON 8 MG/1
4 TABLET, FILM COATED ORAL EVERY 6 HOURS
Qty: 0 | Refills: 0 | Status: DISCONTINUED | OUTPATIENT
Start: 2017-05-06 | End: 2017-05-06

## 2017-05-06 RX ORDER — ACETAMINOPHEN 500 MG
650 TABLET ORAL ONCE
Qty: 0 | Refills: 0 | Status: COMPLETED | OUTPATIENT
Start: 2017-05-06 | End: 2017-05-06

## 2017-05-06 RX ORDER — POTASSIUM PHOSPHATE, MONOBASIC POTASSIUM PHOSPHATE, DIBASIC 236; 224 MG/ML; MG/ML
21 INJECTION, SOLUTION INTRAVENOUS ONCE
Qty: 0 | Refills: 0 | Status: COMPLETED | OUTPATIENT
Start: 2017-05-06 | End: 2017-05-06

## 2017-05-06 RX ADMIN — POTASSIUM PHOSPHATE, MONOBASIC POTASSIUM PHOSPHATE, DIBASIC 64.25 MILLIMOLE(S): 236; 224 INJECTION, SOLUTION INTRAVENOUS at 11:34

## 2017-05-06 RX ADMIN — Medication 650 MILLIGRAM(S): at 12:26

## 2017-05-06 RX ADMIN — HEPARIN SODIUM 7500 UNIT(S): 5000 INJECTION INTRAVENOUS; SUBCUTANEOUS at 14:52

## 2017-05-06 RX ADMIN — SODIUM CHLORIDE 150 MILLILITER(S): 9 INJECTION, SOLUTION INTRAVENOUS at 02:54

## 2017-05-06 RX ADMIN — HEPARIN SODIUM 7500 UNIT(S): 5000 INJECTION INTRAVENOUS; SUBCUTANEOUS at 22:57

## 2017-05-06 RX ADMIN — Medication 100 GRAM(S): at 06:58

## 2017-05-06 RX ADMIN — HEPARIN SODIUM 7500 UNIT(S): 5000 INJECTION INTRAVENOUS; SUBCUTANEOUS at 06:58

## 2017-05-06 NOTE — PROGRESS NOTE ADULT - SUBJECTIVE AND OBJECTIVE BOX
Low grade fever  NG tube removed, remains NPO , no bowel sounds  Lungs clear  normal wbc electrolytes WNL

## 2017-05-06 NOTE — PROGRESS NOTE ADULT - ASSESSMENT
83 yo F s/p right hemicolectomy     NPO/IVF  Pain/nausea control  DVT ppx  AM labs   IS  OOBA   F/U bowel function

## 2017-05-06 NOTE — PROGRESS NOTE ADULT - SUBJECTIVE AND OBJECTIVE BOX
O/N: Tmax 100, VSS. No BF  5/5: SDU, stable throughout day, NGT removed, given IV tylenol for low grade temp     STATUS POST: right hemicolectomy with primary anastomosis    POST OP DAY #: 2 O/N: Tmax 100, VSS. No BF  5/: SDU, stable throughout day, NGT removed, given IV tylenol for low grade temp     STATUS POST: right hemicolectomy with primary anastomosis    POST OP DAY #: 2        SUBJECTIVE:  Flatus: [ ] YES [x ] NO             Bowel Movement: [ ] YES [x ] NO  Pain Control Adequate: [x ] YES [ ] NO  Nausea: [ ] YES [x ] NO            Vomiting: [ ] YES [x ] NO  Diarrhea: [ ] YES [x ] NO         Constipation: [ ] YES [x ] NO     Chest Pain: [ ] YES [x ] NO    SOB:  [ ] YES [x ] NO    MEDICATIONS  (STANDING):  cefoTEtan  IVPB 2Gram(s) IV Intermittent every 12 hours  HYDROmorphone PCA (1 mG/mL) 30milliLiter(s) PCA Continuous PCA Continuous  heparin  Injectable 7500Unit(s) SubCutaneous every 8 hours  lactated ringers. 1000milliLiter(s) IV Continuous <Continuous>    MEDICATIONS  (PRN):      Vital Signs Last 24 Hrs  T(C): 38.1, Max: 38.1 (05-05 @ 16:25)  T(F): 100.5, Max: 100.6 (05-05 @ 17:22)  HR: 90 (88 - 100)  BP: 112/56 (112/56 - 150/64)  BP(mean): 81 (75 - 96)  RR: 18 (13 - 67)  SpO2: 94% (90% - 97%)    PHYSICAL EXAM:      Constitutional: A&Ox3      Respiratory: non labored breathing, no respiratory distress    Cardiovascular: NSR, RRR    Gastrointestinal: Soft, mildly distended, ATTP. no rebound or guarding                 Incision: CDI with staples    Genitourinary: BECKWITH to gravity    Extremities: (-) edema                  I&O's Detail    I & Os for current day (as of 06 May 2017 08:28)  =============================================  IN:    lactated ringers.: 1700 ml    Total IN: 1700 ml  ---------------------------------------------  OUT:    Indwelling Catheter - Urethral: 1245 ml    Nasoenteral Tube: 100 ml    Total OUT: 1345 ml  ---------------------------------------------  Total NET: 355 ml      LABS:                        10.8   9.1   )-----------( 226      ( 06 May 2017 06:43 )             34.0     05-06    138  |  104  |  12  ----------------------------<  105<H>  4.1   |  28  |  0.80    Ca    8.5      06 May 2017 06:43  Phos  1.9     -  Mg     2.2         TPro  7.8  /  Alb  3.5  /  TBili  1.1  /  DBili  x   /  AST  17  /  ALT  27  /  AlkPhos  81  05-04    PT/INR - ( 05 May 2017 05:36 )   PT: 13.6 sec;   INR: 1.22          PTT - ( 04 May 2017 17:33 )  PTT:29.4 sec  Urinalysis Basic - ( 04 May 2017 09:02 )    Color: Yellow / Appearance: Clear / S.025 / pH: x  Gluc: x / Ketone: NEGATIVE  / Bili: NEGATIVE / Urobili: 0.2 E.U./dL   Blood: x / Protein: 100 mg/dL / Nitrite: NEGATIVE   Leuk Esterase: NEGATIVE / RBC: < 5 /HPF / WBC > 10 /HPF   Sq Epi: x / Non Sq Epi: Many /HPF / Bacteria: Present /HPF        RADIOLOGY & ADDITIONAL STUDIES:

## 2017-05-07 LAB
ANION GAP SERPL CALC-SCNC: 6 MMOL/L — LOW (ref 9–16)
BUN SERPL-MCNC: 8 MG/DL — SIGNIFICANT CHANGE UP (ref 7–23)
CALCIUM SERPL-MCNC: 8.5 MG/DL — SIGNIFICANT CHANGE UP (ref 8.5–10.5)
CHLORIDE SERPL-SCNC: 103 MMOL/L — SIGNIFICANT CHANGE UP (ref 96–108)
CO2 SERPL-SCNC: 29 MMOL/L — SIGNIFICANT CHANGE UP (ref 22–31)
CREAT SERPL-MCNC: 0.66 MG/DL — SIGNIFICANT CHANGE UP (ref 0.5–1.3)
GLUCOSE SERPL-MCNC: 107 MG/DL — HIGH (ref 70–99)
HCT VFR BLD CALC: 32 % — LOW (ref 34.5–45)
HGB BLD-MCNC: 10.2 G/DL — LOW (ref 11.5–15.5)
MAGNESIUM SERPL-MCNC: 1.9 MG/DL — SIGNIFICANT CHANGE UP (ref 1.6–2.4)
MCHC RBC-ENTMCNC: 28.7 PG — SIGNIFICANT CHANGE UP (ref 27–34)
MCHC RBC-ENTMCNC: 31.9 G/DL — LOW (ref 32–36)
MCV RBC AUTO: 89.9 FL — SIGNIFICANT CHANGE UP (ref 80–100)
PHOSPHATE SERPL-MCNC: 2.4 MG/DL — LOW (ref 2.5–4.5)
PLATELET # BLD AUTO: 232 K/UL — SIGNIFICANT CHANGE UP (ref 150–400)
POTASSIUM SERPL-MCNC: 3.7 MMOL/L — SIGNIFICANT CHANGE UP (ref 3.5–5.3)
POTASSIUM SERPL-SCNC: 3.7 MMOL/L — SIGNIFICANT CHANGE UP (ref 3.5–5.3)
RBC # BLD: 3.56 M/UL — LOW (ref 3.8–5.2)
RBC # FLD: 14.7 % — SIGNIFICANT CHANGE UP (ref 10.3–16.9)
SODIUM SERPL-SCNC: 138 MMOL/L — SIGNIFICANT CHANGE UP (ref 135–145)
WBC # BLD: 8.6 K/UL — SIGNIFICANT CHANGE UP (ref 3.8–10.5)
WBC # FLD AUTO: 8.6 K/UL — SIGNIFICANT CHANGE UP (ref 3.8–10.5)

## 2017-05-07 RX ORDER — POTASSIUM CHLORIDE 20 MEQ
40 PACKET (EA) ORAL ONCE
Qty: 0 | Refills: 0 | Status: COMPLETED | OUTPATIENT
Start: 2017-05-07 | End: 2017-05-07

## 2017-05-07 RX ORDER — ACETAMINOPHEN 500 MG
975 TABLET ORAL EVERY 8 HOURS
Qty: 0 | Refills: 0 | Status: DISCONTINUED | OUTPATIENT
Start: 2017-05-07 | End: 2017-05-09

## 2017-05-07 RX ORDER — SODIUM,POTASSIUM PHOSPHATES 278-250MG
2 POWDER IN PACKET (EA) ORAL ONCE
Qty: 0 | Refills: 0 | Status: COMPLETED | OUTPATIENT
Start: 2017-05-07 | End: 2017-05-07

## 2017-05-07 RX ORDER — ACETAMINOPHEN 500 MG
650 TABLET ORAL EVERY 6 HOURS
Qty: 0 | Refills: 0 | Status: DISCONTINUED | OUTPATIENT
Start: 2017-05-07 | End: 2017-05-09

## 2017-05-07 RX ADMIN — HEPARIN SODIUM 7500 UNIT(S): 5000 INJECTION INTRAVENOUS; SUBCUTANEOUS at 06:45

## 2017-05-07 RX ADMIN — Medication 40 MILLIEQUIVALENT(S): at 11:28

## 2017-05-07 RX ADMIN — Medication 2 TABLET(S): at 11:28

## 2017-05-07 RX ADMIN — Medication 975 MILLIGRAM(S): at 16:38

## 2017-05-07 RX ADMIN — Medication 975 MILLIGRAM(S): at 16:08

## 2017-05-07 RX ADMIN — HEPARIN SODIUM 7500 UNIT(S): 5000 INJECTION INTRAVENOUS; SUBCUTANEOUS at 21:06

## 2017-05-07 RX ADMIN — HEPARIN SODIUM 7500 UNIT(S): 5000 INJECTION INTRAVENOUS; SUBCUTANEOUS at 14:20

## 2017-05-07 NOTE — PROGRESS NOTE ADULT - ATTENDING COMMENTS
Doing very well. No bowel activity yet but tolerating clear liquids    AF VSS    Abd - soft     Continue current plan

## 2017-05-07 NOTE — PROGRESS NOTE ADULT - ASSESSMENT
81 yo F s/p right hemicolectomy     CLD  Pain/nausea control  DVT ppx  AM labs   IS  OOBA   F/U bowel function  f/u BCx

## 2017-05-07 NOTE — PROGRESS NOTE ADULT - SUBJECTIVE AND OBJECTIVE BOX
sitting out of bed slight abdominal discomfort   no flatus   temp 98.8 pulse 90/min regular has sleep apnea hence recieving supplemental O2  lungs clear,  abdomen mildly tnder to palpation, some bowel sounds heard  will need to go to aftercare facility as she lives alone and has no one to help

## 2017-05-07 NOTE — PROGRESS NOTE ADULT - SUBJECTIVE AND OBJECTIVE BOX
INTERVAL HPI/OVERNIGHT EVENTS: O/N: febrile to 101.2, obtained BCx, CBC (stable), CXR (WNL), UA (WNL). Tolerated CLD, -BF  5/6: d/c abx. Started CLD, pt tolerating diet. T-max 101.2 encourage IS & OOBA    STATUS POST:  R hemicolectomy with primary anastomosis     POST OPERATIVE DAY #: 3 INTERVAL HPI/OVERNIGHT EVENTS: O/N: febrile to 101.2, obtained BCx, CBC (stable), CXR (WNL), UA (WNL). Tolerated CLD, -BF  5/6: d/c abx. Started CLD, pt tolerating diet. T-max 101.2 encourage IS & OOBA    STATUS POST:  R hemicolectomy with primary anastomosis     POST OPERATIVE DAY #: 3        SUBJECTIVE:  Flatus: [ ] YES [x ] NO             Bowel Movement: [ ] YES [ x ] NO  Pain (0-10):            Pain Control Adequate: [x ] YES [ ] NO  Nausea: [ ] YES [x ] NO            Vomiting: [ ] YES [x ] NO  Diarrhea: [ ] YES [x ] NO         Constipation: [ ] YES [x ] NO     Chest Pain: [ ] YES [x ] NO    SOB:  [ ] YES [x ] NO    MEDICATIONS  (STANDING):  HYDROmorphone PCA (1 mG/mL) 30milliLiter(s) PCA Continuous PCA Continuous  heparin  Injectable 7500Unit(s) SubCutaneous every 8 hours    MEDICATIONS  (PRN):  ondansetron    Tablet 4milliGRAM(s) Oral every 8 hours PRN Nausea and/or Vomiting      Vital Signs Last 24 Hrs  T(C): 36.8, Max: 38.4 (05-06 @ 13:54)  T(F): 98.3, Max: 101.2 (05-06 @ 13:54)  HR: 80 (80 - 88)  BP: 134/63 (120/54 - 134/63)  BP(mean): 91 (78 - 91)  RR: 16 (16 - 18)  SpO2: 98% (94% - 98%)    PHYSICAL EXAM:      Constitutional: A&Ox3      Respiratory: non labored breathing, no respiratory distress    Cardiovascular: NSR, RRR    Gastrointestinal: Soft, ND, NT. no rebound or guarding                 Incision: CDI staples over midline abd    Genitourinary: voiding    Extremities: (-) edema                  I&O's Detail    I & Os for current day (as of 07 May 2017 07:38)  =============================================  IN:    lactated ringers.: 1125 ml    IV PiggyBack: 250 ml    Total IN: 1375 ml  ---------------------------------------------  OUT:    Indwelling Catheter - Urethral: 2850 ml    Total OUT: 2850 ml  ---------------------------------------------  Total NET: -1475 ml      LABS:                        10.8   9.4   )-----------( 240      ( 06 May 2017 19:04 )             33.3     05-06    138  |  104  |  12  ----------------------------<  105<H>  4.1   |  28  |  0.80    Ca    8.5      06 May 2017 06:43  Phos  1.9     05-06  Mg     2.2     05-06        Urinalysis Basic - ( 06 May 2017 18:41 )    Color: Yellow / Appearance: Clear / S.020 / pH: x  Gluc: x / Ketone: NEGATIVE  / Bili: NEGATIVE / Urobili: 0.2 E.U./dL   Blood: x / Protein: NEGATIVE mg/dL / Nitrite: NEGATIVE   Leuk Esterase: Small / RBC: < 5 /HPF / WBC 5-10 /HPF   Sq Epi: x / Non Sq Epi: Few /HPF / Bacteria: Present /HPF        RADIOLOGY & ADDITIONAL STUDIES:

## 2017-05-08 LAB
ANION GAP SERPL CALC-SCNC: 7 MMOL/L — LOW (ref 9–16)
BUN SERPL-MCNC: 8 MG/DL — SIGNIFICANT CHANGE UP (ref 7–23)
CALCIUM SERPL-MCNC: 8.6 MG/DL — SIGNIFICANT CHANGE UP (ref 8.5–10.5)
CHLORIDE SERPL-SCNC: 105 MMOL/L — SIGNIFICANT CHANGE UP (ref 96–108)
CO2 SERPL-SCNC: 29 MMOL/L — SIGNIFICANT CHANGE UP (ref 22–31)
CREAT SERPL-MCNC: 0.65 MG/DL — SIGNIFICANT CHANGE UP (ref 0.5–1.3)
GLUCOSE SERPL-MCNC: 116 MG/DL — HIGH (ref 70–99)
HCT VFR BLD CALC: 38.1 % — SIGNIFICANT CHANGE UP (ref 34.5–45)
HGB BLD-MCNC: 12.4 G/DL — SIGNIFICANT CHANGE UP (ref 11.5–15.5)
MAGNESIUM SERPL-MCNC: 2 MG/DL — SIGNIFICANT CHANGE UP (ref 1.6–2.4)
MCHC RBC-ENTMCNC: 29.4 PG — SIGNIFICANT CHANGE UP (ref 27–34)
MCHC RBC-ENTMCNC: 32.5 G/DL — SIGNIFICANT CHANGE UP (ref 32–36)
MCV RBC AUTO: 90.3 FL — SIGNIFICANT CHANGE UP (ref 80–100)
PHOSPHATE SERPL-MCNC: 3.2 MG/DL — SIGNIFICANT CHANGE UP (ref 2.5–4.5)
PLATELET # BLD AUTO: 316 K/UL — SIGNIFICANT CHANGE UP (ref 150–400)
POTASSIUM SERPL-MCNC: 3.8 MMOL/L — SIGNIFICANT CHANGE UP (ref 3.5–5.3)
POTASSIUM SERPL-SCNC: 3.8 MMOL/L — SIGNIFICANT CHANGE UP (ref 3.5–5.3)
RBC # BLD: 4.22 M/UL — SIGNIFICANT CHANGE UP (ref 3.8–5.2)
RBC # FLD: 14.5 % — SIGNIFICANT CHANGE UP (ref 10.3–16.9)
SODIUM SERPL-SCNC: 141 MMOL/L — SIGNIFICANT CHANGE UP (ref 135–145)
WBC # BLD: 9.3 K/UL — SIGNIFICANT CHANGE UP (ref 3.8–10.5)
WBC # FLD AUTO: 9.3 K/UL — SIGNIFICANT CHANGE UP (ref 3.8–10.5)

## 2017-05-08 RX ORDER — POTASSIUM CHLORIDE 20 MEQ
20 PACKET (EA) ORAL ONCE
Qty: 0 | Refills: 0 | Status: COMPLETED | OUTPATIENT
Start: 2017-05-08 | End: 2017-05-08

## 2017-05-08 RX ORDER — IPRATROPIUM/ALBUTEROL SULFATE 18-103MCG
3 AEROSOL WITH ADAPTER (GRAM) INHALATION EVERY 6 HOURS
Qty: 0 | Refills: 0 | Status: DISCONTINUED | OUTPATIENT
Start: 2017-05-08 | End: 2017-05-09

## 2017-05-08 RX ORDER — CEFOTETAN DISODIUM 1 G
2 VIAL (EA) INJECTION EVERY 12 HOURS
Qty: 0 | Refills: 0 | Status: DISCONTINUED | OUTPATIENT
Start: 2017-05-08 | End: 2017-05-09

## 2017-05-08 RX ORDER — FUROSEMIDE 40 MG
20 TABLET ORAL ONCE
Qty: 0 | Refills: 0 | Status: COMPLETED | OUTPATIENT
Start: 2017-05-08 | End: 2017-05-08

## 2017-05-08 RX ADMIN — HEPARIN SODIUM 7500 UNIT(S): 5000 INJECTION INTRAVENOUS; SUBCUTANEOUS at 22:08

## 2017-05-08 RX ADMIN — Medication 20 MILLIEQUIVALENT(S): at 09:49

## 2017-05-08 RX ADMIN — HEPARIN SODIUM 7500 UNIT(S): 5000 INJECTION INTRAVENOUS; SUBCUTANEOUS at 13:11

## 2017-05-08 RX ADMIN — Medication 20 MILLIGRAM(S): at 09:49

## 2017-05-08 RX ADMIN — Medication 975 MILLIGRAM(S): at 18:05

## 2017-05-08 RX ADMIN — HEPARIN SODIUM 7500 UNIT(S): 5000 INJECTION INTRAVENOUS; SUBCUTANEOUS at 06:19

## 2017-05-08 RX ADMIN — Medication 100 GRAM(S): at 18:05

## 2017-05-08 NOTE — PROGRESS NOTE ADULT - SUBJECTIVE AND OBJECTIVE BOX
INTERVAL HPI/OVERNIGHT EVENTS:   SURGERY ATTENDING    STATUS POST:       EXPLORATORY LAPAROTOMY, EXTENSIVE LYSIS OF ADHESIONS, REDUCTION OF INTERNAL HERNIA, RIGHT HEMICOLECTOMY WITH TERMINAL ILEUM RESECTION WITH ILEOCOLONIC ANASTOMOSIS, DECOMPRESSION OF SMALL AND LARGE BOWEL, REPAIR OF UMBILICAL/VENTRAL HERNIA WITH MYOFASCIAL FLAPS BILATERALLY NO MESH FOR CECAL VOLVULUS, MULTIPLE ADHESIONS, INTERNAL AND VENTRAL/UMBILICAL HERNIA.         POST OPERATIVE DAY #: 4    SUBJECTIVE:  Flatus: [X YES [ ] NO             Bowel Movement: [X ] YES [ ] NO  Pain (0-10):         3   Pain Control Adequate: [X ] YES [ ] NO  Nausea: [ ] YES [X ] NO            Vomiting: [ ] YES [X ] NO  Diarrhea: [ ] YES [X ] NO         Constipation: [ ] YES [X ] NO     Chest Pain: [ ] YES [X ] NO    SOB:  [ ] YES [X ] NO    MEDICATIONS  (STANDING):  heparin  Injectable 7500Unit(s) SubCutaneous every 8 hours  cefoTEtan  IVPB 2Gram(s) IV Intermittent every 12 hours    MEDICATIONS  (PRN):  ondansetron    Tablet 4milliGRAM(s) Oral every 8 hours PRN Nausea and/or Vomiting  acetaminophen   Tablet. 650milliGRAM(s) Oral every 6 hours PRN Moderate Pain (4 - 6)  acetaminophen   Tablet. 975milliGRAM(s) Oral every 8 hours PRN Severe Pain (7 - 10)  ALBUTerol/ipratropium for Nebulization 3milliLiter(s) Nebulizer every 6 hours PRN Wheezing      Vital Signs Last 24 Hrs  T(C): 36.7, Max: 37.3 (05-07 @ 21:08)  T(F): 98, Max: 99.2 (05-07 @ 21:08)  HR: 90 (76 - 90)  BP: 141/77 (131/72 - 149/88)  BP(mean): 89 (89 - 94)  RR: 17 (16 - 18)  SpO2: 95% (93% - 97%)            I&O's Detail  I & Os for 24h ending 08 May 2017 07:00  =============================================  IN:    Total IN: 0 ml  ---------------------------------------------  OUT:    Indwelling Catheter - Urethral: 1250 ml    Voided: 500 ml    Total OUT: 1750 ml  ---------------------------------------------  Total NET: -1750 ml    I & Os for current day (as of 08 May 2017 19:16)  =============================================  IN:    Oral Fluid: 300 ml    IV PiggyBack: 100 ml    Total IN: 400 ml  ---------------------------------------------  OUT:    Voided: 850 ml    Total OUT: 850 ml  ---------------------------------------------  Total NET: -450 ml      LABS:                        12.4   9.3   )-----------( 316      ( 08 May 2017 05:54 )             38.1     05-08    141  |  105  |  8   ----------------------------<  116<H>  3.8   |  29  |  0.65    Ca    8.6      08 May 2017 05:52  Phos  3.2     05-08  Mg     2.0     05-08            RADIOLOGY & ADDITIONAL STUDIES:

## 2017-05-08 NOTE — PROGRESS NOTE ADULT - ASSESSMENT
ABD DISTENDED, BS+, BM+, FLATUS+, WOUND CLEAN, INTACT.  TRANSFERRED TO REGULAR FLOOR  , TOLERATING SOFT MECHANICAL DIET, IV ABX, DVT PROFILAXIS, SPIROMETRY, OOB TO CHAIR, EVALUATION FOR REHAB.

## 2017-05-08 NOTE — PHYSICAL THERAPY INITIAL EVALUATION ADULT - CRITERIA FOR SKILLED THERAPEUTIC INTERVENTIONS
functional limitations in following categories/rehab potential/risk reduction/prevention/impairments found

## 2017-05-08 NOTE — PHYSICAL THERAPY INITIAL EVALUATION ADULT - PERTINENT HX OF CURRENT PROBLEM, REHAB EVAL
81 yo obese F with hyperlipidemia and depression PSH of cholecystectomy and oophorectomy presents with 2 days of crampy RLQ abdominal pain, nausea and decreased bowel function. She was having normal bowel movements in the morning Tuesday and Wednesday and then diarrhea in the afternoon both days.

## 2017-05-08 NOTE — PHYSICAL THERAPY INITIAL EVALUATION ADULT - IMPAIRMENTS FOUND, PT EVAL
posture/muscle strength/ergonomics and body mechanics/joint integrity and mobility/aerobic capacity/endurance/gait, locomotion, and balance/gross motor/ROM

## 2017-05-08 NOTE — PHYSICAL THERAPY INITIAL EVALUATION ADULT - GENERAL OBSERVATIONS, REHAB EVAL
Received sitting in bedside chair, in NAD, +dressing over R abdomen, and agreeable to therapy. Abdominal pain is 5/10 and reports that it escalates when she coughs.

## 2017-05-08 NOTE — PROGRESS NOTE ADULT - SUBJECTIVE AND OBJECTIVE BOX
pt has passed flatus and is having sip of water  is mildly short of breath and has some rales at right base  bp pressure 132/80 pulse 85/min  suggest lasix 20 mg iv once

## 2017-05-08 NOTE — PROGRESS NOTE ADULT - ASSESSMENT
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

## 2017-05-08 NOTE — PHYSICAL THERAPY INITIAL EVALUATION ADULT - PLANNED THERAPY INTERVENTIONS, PT EVAL
transfer training/gait training/postural re-education/strengthening/bed mobility training/neuromuscular re-education/ROM/stretching/balance training

## 2017-05-08 NOTE — PROGRESS NOTE ADULT - SUBJECTIVE AND OBJECTIVE BOX
O/N: Afebrile, VSS. No BF. abd distended, no nausea.   5/7: d/c'd diaz-passed TOV -BF. VSS afebrile. OOBA. d/c'd PCA pt has minimal pain. encourage IS    STATUS POST: right hemicolectomy with primary anastomosis     POST OP DAY #: 4 O/N: Afebrile, VSS. No BF. abd distended, no nausea.   : d/c'd diaz-passed TOV -BF. VSS afebrile. OOBA. d/c'd PCA pt has minimal pain. encourage IS    STATUS POST: right hemicolectomy with primary anastomosis     POST OP DAY #: 4    SUBJECTIVE:  Patient seen and examined at bedside with surgery chief resident on AM rounds. Patient complains of poor sleep overnight. Reports +flatus. Asking for more substantial food, wants "chicken and mashed potatoes".     MEDICATIONS  (STANDING):  heparin  Injectable 7500Unit(s) SubCutaneous every 8 hours    MEDICATIONS  (PRN):  ondansetron    Tablet 4milliGRAM(s) Oral every 8 hours PRN Nausea and/or Vomiting  acetaminophen   Tablet. 650milliGRAM(s) Oral every 6 hours PRN Moderate Pain (4 - 6)  acetaminophen   Tablet. 975milliGRAM(s) Oral every 8 hours PRN Severe Pain (7 - 10)      Vital Signs Last 24 Hrs  T(C): 36.4, Max: 37.6 (05-07 @ 17:40)  T(F): 97.5, Max: 99.7 (05-07 @ 17:40)  HR: 82 (76 - 94)  BP: 131/72 (131/72 - 148/71)  BP(mean): 94 (88 - 102)  RR: 16 (16 - 20)  SpO2: 97% (92% - 97%)    PHYSICAL EXAM:      Constitutional: A&Ox3    Respiratory: non labored breathing, no respiratory distress    Cardiovascular: NSR, RRR    Gastrointestinal: Softly distended, ATTP                 Incision: Midline incision C/D/I, dressings replaced.     Genitourinary: voids    Extremities: (-) edema                  I&O's Detail  I & Os for 24h ending 07 May 2017 07:00  =============================================  IN:    lactated ringers.: 1125 ml    IV PiggyBack: 250 ml    Total IN: 1375 ml  ---------------------------------------------  OUT:    Indwelling Catheter - Urethral: 2850 ml    Total OUT: 2850 ml  ---------------------------------------------  Total NET: -1475 ml    I & Os for current day (as of 08 May 2017 06:54)  =============================================  IN:    Total IN: 0 ml  ---------------------------------------------  OUT:    Indwelling Catheter - Urethral: 1250 ml    Voided: 500 ml    Total OUT: 1750 ml  ---------------------------------------------  Total NET: -1750 ml      LABS:                        12.4   9.3   )-----------( 316      ( 08 May 2017 05:54 )             38.1     05-08    141  |  105  |  8   ----------------------------<  116<H>  3.8   |  29  |  0.65    Ca    8.6      08 May 2017 05:52  Phos  3.2     05-08  Mg     2.0     05-08        Urinalysis Basic - ( 06 May 2017 18:41 )    Color: Yellow / Appearance: Clear / S.020 / pH: x  Gluc: x / Ketone: NEGATIVE  / Bili: NEGATIVE / Urobili: 0.2 E.U./dL   Blood: x / Protein: NEGATIVE mg/dL / Nitrite: NEGATIVE   Leuk Esterase: Small / RBC: < 5 /HPF / WBC 5-10 /HPF   Sq Epi: x / Non Sq Epi: Few /HPF / Bacteria: Present /HPF        RADIOLOGY & ADDITIONAL STUDIES:

## 2017-05-09 ENCOUNTER — TRANSCRIPTION ENCOUNTER (OUTPATIENT)
Age: 82
End: 2017-05-09

## 2017-05-09 VITALS
SYSTOLIC BLOOD PRESSURE: 149 MMHG | HEART RATE: 85 BPM | OXYGEN SATURATION: 96 % | TEMPERATURE: 99 F | RESPIRATION RATE: 17 BRPM | DIASTOLIC BLOOD PRESSURE: 83 MMHG

## 2017-05-09 LAB
ANION GAP SERPL CALC-SCNC: 10 MMOL/L — SIGNIFICANT CHANGE UP (ref 9–16)
BUN SERPL-MCNC: 9 MG/DL — SIGNIFICANT CHANGE UP (ref 7–23)
CALCIUM SERPL-MCNC: 8.5 MG/DL — SIGNIFICANT CHANGE UP (ref 8.5–10.5)
CHLORIDE SERPL-SCNC: 102 MMOL/L — SIGNIFICANT CHANGE UP (ref 96–108)
CO2 SERPL-SCNC: 26 MMOL/L — SIGNIFICANT CHANGE UP (ref 22–31)
CREAT SERPL-MCNC: 0.73 MG/DL — SIGNIFICANT CHANGE UP (ref 0.5–1.3)
GLUCOSE SERPL-MCNC: 106 MG/DL — HIGH (ref 70–99)
HCT VFR BLD CALC: 34.6 % — SIGNIFICANT CHANGE UP (ref 34.5–45)
HGB BLD-MCNC: 11.1 G/DL — LOW (ref 11.5–15.5)
MAGNESIUM SERPL-MCNC: 2.1 MG/DL — SIGNIFICANT CHANGE UP (ref 1.6–2.4)
MCHC RBC-ENTMCNC: 28.7 PG — SIGNIFICANT CHANGE UP (ref 27–34)
MCHC RBC-ENTMCNC: 32.1 G/DL — SIGNIFICANT CHANGE UP (ref 32–36)
MCV RBC AUTO: 89.4 FL — SIGNIFICANT CHANGE UP (ref 80–100)
PHOSPHATE SERPL-MCNC: 3.8 MG/DL — SIGNIFICANT CHANGE UP (ref 2.5–4.5)
PLATELET # BLD AUTO: 302 K/UL — SIGNIFICANT CHANGE UP (ref 150–400)
POTASSIUM SERPL-MCNC: 3.7 MMOL/L — SIGNIFICANT CHANGE UP (ref 3.5–5.3)
POTASSIUM SERPL-SCNC: 3.7 MMOL/L — SIGNIFICANT CHANGE UP (ref 3.5–5.3)
RBC # BLD: 3.87 M/UL — SIGNIFICANT CHANGE UP (ref 3.8–5.2)
RBC # FLD: 14.6 % — SIGNIFICANT CHANGE UP (ref 10.3–16.9)
SODIUM SERPL-SCNC: 138 MMOL/L — SIGNIFICANT CHANGE UP (ref 135–145)
SURGICAL PATHOLOGY STUDY: SIGNIFICANT CHANGE UP
WBC # BLD: 8.2 K/UL — SIGNIFICANT CHANGE UP (ref 3.8–10.5)
WBC # FLD AUTO: 8.2 K/UL — SIGNIFICANT CHANGE UP (ref 3.8–10.5)

## 2017-05-09 PROCEDURE — 80048 BASIC METABOLIC PNL TOTAL CA: CPT

## 2017-05-09 PROCEDURE — 88307 TISSUE EXAM BY PATHOLOGIST: CPT

## 2017-05-09 PROCEDURE — 85025 COMPLETE CBC W/AUTO DIFF WBC: CPT

## 2017-05-09 PROCEDURE — 71045 X-RAY EXAM CHEST 1 VIEW: CPT

## 2017-05-09 PROCEDURE — 96374 THER/PROPH/DIAG INJ IV PUSH: CPT | Mod: XU

## 2017-05-09 PROCEDURE — 83690 ASSAY OF LIPASE: CPT

## 2017-05-09 PROCEDURE — 85610 PROTHROMBIN TIME: CPT

## 2017-05-09 PROCEDURE — 83036 HEMOGLOBIN GLYCOSYLATED A1C: CPT

## 2017-05-09 PROCEDURE — 93005 ELECTROCARDIOGRAM TRACING: CPT

## 2017-05-09 PROCEDURE — 86901 BLOOD TYPING SEROLOGIC RH(D): CPT

## 2017-05-09 PROCEDURE — 81001 URINALYSIS AUTO W/SCOPE: CPT

## 2017-05-09 PROCEDURE — 85730 THROMBOPLASTIN TIME PARTIAL: CPT

## 2017-05-09 PROCEDURE — 97161 PT EVAL LOW COMPLEX 20 MIN: CPT

## 2017-05-09 PROCEDURE — 86850 RBC ANTIBODY SCREEN: CPT

## 2017-05-09 PROCEDURE — 74177 CT ABD & PELVIS W/CONTRAST: CPT

## 2017-05-09 PROCEDURE — 99285 EMERGENCY DEPT VISIT HI MDM: CPT | Mod: 25

## 2017-05-09 PROCEDURE — 36415 COLL VENOUS BLD VENIPUNCTURE: CPT

## 2017-05-09 PROCEDURE — 85027 COMPLETE CBC AUTOMATED: CPT

## 2017-05-09 PROCEDURE — 86900 BLOOD TYPING SEROLOGIC ABO: CPT

## 2017-05-09 PROCEDURE — 83735 ASSAY OF MAGNESIUM: CPT

## 2017-05-09 PROCEDURE — 84100 ASSAY OF PHOSPHORUS: CPT

## 2017-05-09 PROCEDURE — 97116 GAIT TRAINING THERAPY: CPT

## 2017-05-09 PROCEDURE — 80053 COMPREHEN METABOLIC PANEL: CPT

## 2017-05-09 PROCEDURE — 74022 RADEX COMPL AQT ABD SERIES: CPT

## 2017-05-09 PROCEDURE — 94640 AIRWAY INHALATION TREATMENT: CPT

## 2017-05-09 PROCEDURE — 87040 BLOOD CULTURE FOR BACTERIA: CPT

## 2017-05-09 RX ORDER — METRONIDAZOLE 500 MG
1 TABLET ORAL
Qty: 15 | Refills: 0 | OUTPATIENT
Start: 2017-05-09 | End: 2017-05-14

## 2017-05-09 RX ORDER — CIPROFLOXACIN LACTATE 400MG/40ML
1 VIAL (ML) INTRAVENOUS
Qty: 10 | Refills: 0 | OUTPATIENT
Start: 2017-05-09 | End: 2017-05-14

## 2017-05-09 RX ADMIN — Medication 100 GRAM(S): at 18:13

## 2017-05-09 RX ADMIN — Medication 100 GRAM(S): at 05:50

## 2017-05-09 RX ADMIN — Medication 3 MILLILITER(S): at 14:20

## 2017-05-09 RX ADMIN — HEPARIN SODIUM 7500 UNIT(S): 5000 INJECTION INTRAVENOUS; SUBCUTANEOUS at 05:46

## 2017-05-09 RX ADMIN — HEPARIN SODIUM 7500 UNIT(S): 5000 INJECTION INTRAVENOUS; SUBCUTANEOUS at 14:02

## 2017-05-09 NOTE — PROGRESS NOTE ADULT - PROVIDER SPECIALTY LIST ADULT
Cardiology
Internal Medicine
Internal Medicine
SICU
Surgery
Internal Medicine

## 2017-05-09 NOTE — PROGRESS NOTE ADULT - SUBJECTIVE AND OBJECTIVE BOX
O/N: Tolerating soft diet. abd distended. Afebrile, VSS.   5/8: adv to [facility]. +flatus/bm. stepped down to regional. [facility] to go [facility]. Dr. king consulted.     STATUS POST: right hemicolectomy     POST OP DAY #: 5 O/N: Tolerating soft diet. abd distended. Afebrile, VSS.   5/8: adv to [facility]. +flatus/bm. stepped down to regional. wants to go [facility] for [facility]. Dr. king consulted.     STATUS POST: right hemicolectomy     POST OP DAY #: 5      SUBJECTIVE:  Flatus: [ x] YES [ ] NO             Bowel Movement: [x ] YES [ ] NO  Pain (0-10):            Pain Control Adequate: [x ] YES [ ] NO  Nausea: [ ] YES [x ] NO            Vomiting: [ ] YES [x ] NO  Diarrhea: [ ] YES [ ] NO         Constipation: [ ] YES [ ] NO     Chest Pain: [ ] YES [x ] NO    SOB:  [ ] YES [x ] NO    MEDICATIONS  (STANDING):  heparin  Injectable 7500Unit(s) SubCutaneous every 8 hours  cefoTEtan  IVPB 2Gram(s) IV Intermittent every 12 hours    MEDICATIONS  (PRN):  ondansetron    Tablet 4milliGRAM(s) Oral every 8 hours PRN Nausea and/or Vomiting  acetaminophen   Tablet. 650milliGRAM(s) Oral every 6 hours PRN Moderate Pain (4 - 6)  acetaminophen   Tablet. 975milliGRAM(s) Oral every 8 hours PRN Severe Pain (7 - 10)  ALBUTerol/ipratropium for Nebulization 3milliLiter(s) Nebulizer every 6 hours PRN Wheezing      Vital Signs Last 24 Hrs  T(C): 35.8, Max: 37.1 (05-08 @ 20:10)  T(F): 96.4, Max: 98.8 (05-08 @ 20:10)  HR: 78 (78 - 91)  BP: 147/82 (132/62 - 149/88)  BP(mean): 89 (89 - 89)  RR: 16 (16 - 18)  SpO2: 97% (94% - 97%)    Physical Exam:  General: NAD  Pulmonary: Nonlabored breathing, no respiratory distress  Cardiovascular: NSR  Abdominal: soft, NT/ND, no organomegaly, staples in place on midline wound, mild erythema at bottom of wound, abdominal binder in place  Extremities: WWP, normal strength, no clubbing/cyanosis/edema  Neuro: A/O x3, no focal deficits, normal sensation  Pulses: palpable distal pulses    Lines/drains/tubes:    I&O's Summary  I & Os for 24h ending 08 May 2017 07:00  =============================================  IN: 0 ml / OUT: 1750 ml / NET: -1750 ml    I & Os for current day (as of 09 May 2017 06:58)  =============================================  IN: 400 ml / OUT: 1550 ml / NET: -1150 ml      LABS:                        12.4   9.3   )-----------( 316      ( 08 May 2017 05:54 )             38.1     05-08    141  |  105  |  8   ----------------------------<  116<H>  3.8   |  29  |  0.65    Ca    8.6      08 May 2017 05:52  Phos  3.2     05-08  Mg     2.0     05-08          CAPILLARY BLOOD GLUCOSE        RADIOLOGY & ADDITIONAL STUDIES:

## 2017-05-09 NOTE — PROGRESS NOTE ADULT - SUBJECTIVE AND OBJECTIVE BOX
INTERVAL HPI/OVERNIGHT EVENTS:   SURGERY ATTENDING    STATUS POST:       EXPLORATORY LAPAROTOMY, EXTENSIVE LYSIS OF ADHESIONS, REDUCTION OF INTERNAL HERNIA, RIGHT HEMICOLECTOMY WITH TERMINAL ILEUM RESECTION WITH ILEOCOLONIC ANASTOMOSIS, DECOMPRESSION OF SMALL AND LARGE BOWEL, REPAIR OF UMBILICAL/VENTRAL HERNIA WITH MYOFASCIAL FLAPS BILATERALLY NO MESH FOR CECAL VOLVULUS, MULTIPLE ADHESIONS, INTERNAL AND VENTRAL/UMBILICAL HERNIA.         POST OPERATIVE DAY #: 5    SUBJECTIVE:  Flatus: [X ] YES [ ] NO             Bowel Movement: [X ] YES [ ] NO  Pain (0-10):        1    Pain Control Adequate: [X ] YES [ ] NO  Nausea: [ ] YES [X ] NO            Vomiting: [ ] YES [X ] NO  Diarrhea: [ ] YES [X ] NO         Constipation: [ ] YES [X ] NO     Chest Pain: [ ] YES [X ] NO    SOB:  [ ] YES [X ] NO    MEDICATIONS  (STANDING):  heparin  Injectable 7500Unit(s) SubCutaneous every 8 hours  cefoTEtan  IVPB 2Gram(s) IV Intermittent every 12 hours    MEDICATIONS  (PRN):  ondansetron    Tablet 4milliGRAM(s) Oral every 8 hours PRN Nausea and/or Vomiting  acetaminophen   Tablet. 650milliGRAM(s) Oral every 6 hours PRN Moderate Pain (4 - 6)  acetaminophen   Tablet. 975milliGRAM(s) Oral every 8 hours PRN Severe Pain (7 - 10)  ALBUTerol/ipratropium for Nebulization 3milliLiter(s) Nebulizer every 6 hours PRN Wheezing      Vital Signs Last 24 Hrs  T(C): 36.6, Max: 37.1 (05-08 @ 20:10)  T(F): 97.8, Max: 98.8 (05-08 @ 20:10)  HR: 83 (78 - 91)  BP: 129/76 (129/76 - 147/83)  BP(mean): --  RR: 16 (16 - 17)  SpO2: 95% (95% - 97%)            I&O's Detail  I & Os for 24h ending 09 May 2017 07:00  =============================================  IN:    Oral Fluid: 300 ml    IV PiggyBack: 100 ml    Total IN: 400 ml  ---------------------------------------------  OUT:    Voided: 1550 ml    Total OUT: 1550 ml  ---------------------------------------------  Total NET: -1150 ml    I & Os for current day (as of 09 May 2017 14:45)  =============================================  IN:    IV PiggyBack: 1350 ml    Oral Fluid: 360 ml    Total IN: 1710 ml  ---------------------------------------------  OUT:    Total OUT: 0 ml  ---------------------------------------------  Total NET: 1710 ml      LABS:                        11.1   8.2   )-----------( 302      ( 09 May 2017 07:16 )             34.6     05-09    138  |  102  |  9   ----------------------------<  106<H>  3.7   |  26  |  0.73    Ca    8.5      09 May 2017 07:16  Phos  3.8     05-09  Mg     2.1     05-09            RADIOLOGY & ADDITIONAL STUDIES:

## 2017-05-09 NOTE — DISCHARGE NOTE ADULT - HOSPITAL COURSE
81 yo F presented on 5/4/17 with abdominal pain from acute cecal volvulus. That day, she underwent emergent right hemicolectomy with primary anastomosis. Post op, admitted to SICU. NGT removed. Clear liquid diet started, which she tolerated. Stepped down to regional floor on 5/5. Had some post op fevers, 2/2 atelectasis. On 5/7, diaz removed, passed trial of void. Pain controlled. +Flatus/BM on 5/8. Advanced to soft diet, which she tolerated. On day of discharge, pt stable and discharged to [facility] rehab. 83 yo F presented on 5/4/17 with abdominal pain from acute cecal volvulus. That day, she underwent emergent right hemicolectomy with primary anastomosis. Post op, admitted to SICU. NGT removed. Clear liquid diet started, which she tolerated. Stepped down to regional floor on 5/5. Had some post op fevers, 2/2 atelectasis. On 5/7, diaz removed, passed trial of void. Pain controlled. +Flatus/BM on 5/8. Advanced to soft diet, which she tolerated. On day of discharge, [facility] f 81 [facility] F presented on 5/4/17 with abdominal pain from acute cecal volvulus. That day, she underwent emergent right hemicolectomy with primary anastomosis. Post op, admitted to SICU. NGT removed. Clear liquid diet started, which she tolerated. Stepped down to regional floor on 5/5. Had some post op fevers, 2/2 atelectasis. On 5/7, diaz removed, passed trial of void. Pain controlled. +Flatus/BM on 5/8. Advanced to soft diet, which she tolerated. On day of discharge, pt stable and discharged to [facility]acute Rehab.

## 2017-05-09 NOTE — CONSULT NOTE ADULT - SUBJECTIVE AND OBJECTIVE BOX
[facility] ENGLISH    [id number]    Patient is a 82y old  Female who presents with a chief complaint of Cecal volvulus (09 May 2017 07:10)      HPI:  83 yo obese F with hyperlipidemia and depression PSH of cholecystectomy and oophorectomy presents with 2 days of crampy RLQ abdominal pain, nausea and decreased bowel function. She was having normal bowel movements in the morning Tuesday and Wednesday and then diarrhea in the afternoon both days. Her last BM was yesterday and she's no longer passing flatus. She has had anorexia. She has nausea but is not vomiting. No fevers or chills. Is still able to ambulate. No decrease in caliber of bms or blood in stool. (04 May 2017 11:59)      PAST MEDICAL & SURGICAL HISTORY:  Depression  Hyperlipidemia, unspecified hyperlipidemia type  H/O oophorectomy  History of cholecystectomy        Social History:    FAMILY HISTORY:      Functional Level Prior to Admission:                          11.1   8.2   )-----------( 302      ( 09 May 2017 07:16 )             34.6       05-09    138  |  102  |  9   ----------------------------<  106<H>  3.7   |  26  |  0.73    Ca    8.5      09 May 2017 07:16  Phos  3.8     05-09  Mg     2.1     05-09        Vital Signs Last 24 Hrs  T(C): 36.5, Max: 37.1 (05-08 @ 20:10)  T(F): 97.7, Max: 98.8 (05-08 @ 20:10)  HR: 81 (78 - 91)  BP: 147/83 (141/77 - 149/88)  BP(mean): --  RR: 17 (16 - 18)  SpO2: 97% (95% - 97%)      PHYSICAL EXAM: This 82 y-o female was admitted on 5/4/17 with abdominal pain and nause. Underwent exp. laparotomy and Rt. hemicolectomy for cecal volvulus on 5/4/17. h/o back pain/ Lt. sciatica and OA of knees. s/p cholecystectomy. Oophorectomy. Obesity. Lives alone. Ambulation with cane.      Constitutional:lying in bed. Alert and stable. Obese. In no distress.    Eyes:neg.    ENMT:neg.    Neck:supple. no pain    Breasts:    Back:occasional back pain    Respiratory:    Cardiovascular:    Gastrointestinal:abdomen surgical wound dry and clean, no pain.     Genitourinary:    Rectal:    Extremities:full rom, oa knees, no pain. no edema.    Vascular:    Neurological:alert, oriented and cooperative. Deconditioned, no focal deficits. 3+-4/5. needs min. to mod assistance for bed mobility. Ambulation with rolling walker with supervision.    Skin:    Lymph Nodes:    Musculoskeletal:    Psychiatric:            Impression: 1. Deconditioned. 2. s/p exp. lap Rt. hemicolectomy for cecal volvulus. 3. OS knees and spine.    Recommendations: 1. PT initiated. 2. Subacute rehab. placement (discussed with ).
83 yo obese F with hyperlipidemia and depression PSH of cholecystectomy and oophorectomy presents with 2 days of crampy RLQ abdominal pain, nausea and decreased bowel function. She was having normal bowel movements in the morning Tuesday and Wednesday and then diarrhea in the afternoon both days. Her last BM was yesterday and she's no longer passing flatus. She has had anorexia. She has nausea but is not vomiting. No fevers or chills. Is still able to ambulate. No decrease in caliber of bms or blood in stool.     PMH: HLD, Depression  PSH: Cholecystectomy, Oophorectomy  Meds: Cymbalta 30mg, atorvastatin, klonopin 05mg    5/4: R hemicolectomy with primary anastomosis. , , IVF 1600    ICU Vital Signs Last 24 Hrs  T(C): 36.8, Max: 37 (05-04 @ 08:52)  T(F): 98.2, Max: 98.6 (05-04 @ 08:52)  HR: 95 (86 - 96)  BP: 158/90 (134/79 - 158/90)  BP(mean): --  ABP: --  ABP(mean): --  RR: 18 (18 - 18)  SpO2: 96% (96% - 98%)      PHYSICAL EXAM:    Constitutional: A little groggy but conversant in NAD, obese    Respiratory: Clear to auscultation    Cardiovascular: RRR    Gastrointestinal: softly distended, appropriately ttp, ex lap dressing clean and dry    Extremities: spider veins, equal bilaterally    Postop labs pending

## 2017-05-09 NOTE — PROGRESS NOTE ADULT - ASSESSMENT
83 yo F with cecal volvulus now s/p right hemicolectomy POD 4    Soft diet   Pain/nausea control  DVT ppx  IS  OOBA  AM labs  Dispo planning

## 2017-05-09 NOTE — PROGRESS NOTE ADULT - ASSESSMENT
ABD DISTENDED, BS+, BM+, FLATUS+, WOUND CLEAN, INTACT.  TRANSFERRED TO REGULAR FLOOR  , TOLERATING SOFT MECHANICAL DIET, IV ABX, DVT PROFILAXIS, SPIROMETRY, OOB TO CHAIR, REHAB PLACEMENT

## 2017-05-09 NOTE — DISCHARGE NOTE ADULT - MEDICATION SUMMARY - MEDICATIONS TO TAKE
I will START or STAY ON the medications listed below when I get home from the hospital:    Flagyl 500 mg oral tablet  -- 1 tab(s) by mouth 3 times a day  -- Do not drink alcoholic beverages when taking this medication.  Finish all this medication unless otherwise directed by prescriber.  May discolor urine or feces.    -- Indication: For antibiotic    KlonoPIN 0.5 mg oral tablet  --  by mouth , As Needed  -- Indication: For Home med    Cymbalta 30 mg oral delayed release capsule  -- 1 cap(s) by mouth 2 times a day  -- Indication: For Home med    atorvastatin  -- 10 milligram(s) by mouth once a day  -- Indication: For Home med    ciprofloxacin 500 mg oral tablet  -- 1 tab(s) by mouth 2 times a day  -- Avoid prolonged or excessive exposure to direct and/or artificial sunlight while taking this medication.  Check with your doctor before becoming pregnant.  Do not take dairy products, antacids, or iron preparations within one hour of this medication.  Finish all this medication unless otherwise directed by prescriber.  Medication should be taken with plenty of water.    -- Indication: For antibiotic

## 2017-05-09 NOTE — DISCHARGE NOTE ADULT - PLAN OF CARE
Recovery Follow up with Dr. Herrera in 1-2 weeks. Call the office at the number below to schedule your appointment. You may shower; soap and water over incision sites. Do not scrub. Pat dry when done. No tub bathing or swimming until cleared. Keep incision sites out of the sun as scars will darken. Ambulate as tolerated, but no heavy lifting (>10lbs) or strenuous exercise. You may resume regular diet. You should be urinating at least 3-4x per day. Call the office if you experience increasing abdominal pain, nausea, vomiting, or temperature >101.4F. At [facility] acute [facility], take Cipro twice a day for 5 days, and take Flagyl three times a day for 5 days. Do not skip any doses, finish all pills.     Follow up with Dr. Herrera in 1-2 weeks. Call the office at the number below to schedule your appointment. You may shower; soap and water over incision sites. Do not scrub. Pat dry when done. No tub bathing or swimming until cleared. Keep incision sites out of the sun as scars will darken. Ambulate as tolerated, but no heavy lifting (>10lbs) or strenuous exercise. You may resume regular diet. You should be urinating at least 3-4x per day. Call the office if you experience increasing abdominal pain, nausea, vomiting, or temperature >101.4F. Continue Ciprofloxacin 500mg twice a day for 5 days, and take Flagyl 500mg three times a day for 5 days. Do not skip any doses, finish all pills.     Follow up with Dr. Herrera in 1-2 weeks. Call the office at the number below to schedule your appointment. You may shower; soap and water over incision sites. Do not scrub. Pat dry when done. No tub bathing or swimming until cleared. Keep incision sites out of the sun as scars will darken. Ambulate as tolerated, but no heavy lifting (>10lbs) or strenuous exercise. You may resume regular diet. You should be urinating at least 3-4x per day. Call the office if you experience increasing abdominal pain, nausea, vomiting, or temperature >101.4F.

## 2017-05-09 NOTE — DISCHARGE NOTE ADULT - CARE PLAN
Principal Discharge DX:	Volvulus  Goal:	Recovery  Instructions for follow-up, activity and diet:	Follow up with Dr. Herrera in 1-2 weeks. Call the office at the number below to schedule your appointment. You may shower; soap and water over incision sites. Do not scrub. Pat dry when done. No tub bathing or swimming until cleared. Keep incision sites out of the sun as scars will darken. Ambulate as tolerated, but no heavy lifting (>10lbs) or strenuous exercise. You may resume regular diet. You should be urinating at least 3-4x per day. Call the office if you experience increasing abdominal pain, nausea, vomiting, or temperature >101.4F. Principal Discharge DX:	Volvulus  Goal:	Recovery  Instructions for follow-up, activity and diet:	At [facility], take Cipro twice a day for 5 days, and take Flagyl three times a day for 5 days. Do not skip any doses, finish all pills.     Follow up with Dr. Herrera in 1-2 weeks. Call the office at the number below to schedule your appointment. You may shower; soap and water over incision sites. Do not scrub. Pat dry when done. No tub bathing or swimming until cleared. Keep incision sites out of the sun as scars will darken. Ambulate as tolerated, but no heavy lifting (>10lbs) or strenuous exercise. You may resume regular diet. You should be urinating at least 3-4x per day. Call the office if you experience increasing abdominal pain, nausea, vomiting, or temperature >101.4F. Principal Discharge DX:	Volvulus  Goal:	Recovery  Instructions for follow-up, activity and diet:	Continue Ciprofloxacin 500mg twice a day for 5 days, and take Flagyl 500mg three times a day for 5 days. Do not skip any doses, finish all pills.     Follow up with Dr. Herrera in 1-2 weeks. Call the office at the number below to schedule your appointment. You may shower; soap and water over incision sites. Do not scrub. Pat dry when done. No tub bathing or swimming until cleared. Keep incision sites out of the sun as scars will darken. Ambulate as tolerated, but no heavy lifting (>10lbs) or strenuous exercise. You may resume regular diet. You should be urinating at least 3-4x per day. Call the office if you experience increasing abdominal pain, nausea, vomiting, or temperature >101.4F.

## 2017-05-09 NOTE — DISCHARGE NOTE ADULT - PATIENT PORTAL LINK FT
“You can access the Follow[facility] Patient Portal, offered by [facility], by registering with the following website: [email or web address]”

## 2017-05-11 DIAGNOSIS — E66.01 MORBID (SEVERE) OBESITY DUE TO EXCESS CALORIES: ICD-10-CM

## 2017-05-11 DIAGNOSIS — K56.2 VOLVULUS: ICD-10-CM

## 2017-05-11 DIAGNOSIS — R50.82 POSTPROCEDURAL FEVER: ICD-10-CM

## 2017-05-11 DIAGNOSIS — J98.11 ATELECTASIS: ICD-10-CM

## 2017-05-11 DIAGNOSIS — Z88.2 ALLERGY STATUS TO SULFONAMIDES: ICD-10-CM

## 2017-05-11 DIAGNOSIS — E78.5 HYPERLIPIDEMIA, UNSPECIFIED: ICD-10-CM

## 2017-05-11 DIAGNOSIS — K43.9 VENTRAL HERNIA WITHOUT OBSTRUCTION OR GANGRENE: ICD-10-CM

## 2017-05-11 DIAGNOSIS — Z88.0 ALLERGY STATUS TO PENICILLIN: ICD-10-CM

## 2017-05-11 DIAGNOSIS — K66.0 PERITONEAL ADHESIONS (POSTPROCEDURAL) (POSTINFECTION): ICD-10-CM

## 2017-05-11 DIAGNOSIS — F32.9 MAJOR DEPRESSIVE DISORDER, SINGLE EPISODE, UNSPECIFIED: ICD-10-CM

## 2017-05-11 DIAGNOSIS — K42.9 UMBILICAL HERNIA WITHOUT OBSTRUCTION OR GANGRENE: ICD-10-CM

## 2017-05-11 DIAGNOSIS — Z88.8 ALLERGY STATUS TO OTHER DRUGS, MEDICAMENTS AND BIOLOGICAL SUBSTANCES STATUS: ICD-10-CM

## 2017-05-11 DIAGNOSIS — M54.32 SCIATICA, LEFT SIDE: ICD-10-CM

## 2017-05-11 DIAGNOSIS — G47.33 OBSTRUCTIVE SLEEP APNEA (ADULT) (PEDIATRIC): ICD-10-CM

## 2017-05-11 DIAGNOSIS — Z90.49 ACQUIRED ABSENCE OF OTHER SPECIFIED PARTS OF DIGESTIVE TRACT: ICD-10-CM

## 2017-05-11 DIAGNOSIS — Z90.721 ACQUIRED ABSENCE OF OVARIES, UNILATERAL: ICD-10-CM

## 2017-05-11 DIAGNOSIS — K46.0 UNSPECIFIED ABDOMINAL HERNIA WITH OBSTRUCTION, WITHOUT GANGRENE: ICD-10-CM

## 2017-05-11 DIAGNOSIS — M17.0 BILATERAL PRIMARY OSTEOARTHRITIS OF KNEE: ICD-10-CM

## 2017-05-11 DIAGNOSIS — K57.30 DIVERTICULOSIS OF LARGE INTESTINE WITHOUT PERFORATION OR ABSCESS WITHOUT BLEEDING: ICD-10-CM

## 2017-05-11 LAB
CULTURE RESULTS: SIGNIFICANT CHANGE UP
CULTURE RESULTS: SIGNIFICANT CHANGE UP
SPECIMEN SOURCE: SIGNIFICANT CHANGE UP
SPECIMEN SOURCE: SIGNIFICANT CHANGE UP

## 2017-06-12 ENCOUNTER — OUTPATIENT (OUTPATIENT)
Dept: OUTPATIENT SERVICES | Facility: HOSPITAL | Age: 82
LOS: 1 days | End: 2017-06-12
Payer: COMMERCIAL

## 2017-06-12 ENCOUNTER — APPOINTMENT (OUTPATIENT)
Dept: SLEEP CENTER | Facility: HOSPITAL | Age: 82
End: 2017-06-12

## 2017-06-12 DIAGNOSIS — Z90.721 ACQUIRED ABSENCE OF OVARIES, UNILATERAL: Chronic | ICD-10-CM

## 2017-06-12 DIAGNOSIS — G47.33 OBSTRUCTIVE SLEEP APNEA (ADULT) (PEDIATRIC): ICD-10-CM

## 2017-06-12 DIAGNOSIS — Z90.49 ACQUIRED ABSENCE OF OTHER SPECIFIED PARTS OF DIGESTIVE TRACT: Chronic | ICD-10-CM

## 2017-06-12 PROBLEM — E78.5 HYPERLIPIDEMIA, UNSPECIFIED: Chronic | Status: ACTIVE | Noted: 2017-05-04

## 2017-06-12 PROCEDURE — 95810 POLYSOM 6/> YRS 4/> PARAM: CPT | Mod: 26

## 2017-06-12 PROCEDURE — 95810 POLYSOM 6/> YRS 4/> PARAM: CPT

## 2017-06-14 ENCOUNTER — FORM ENCOUNTER (OUTPATIENT)
Age: 82
End: 2017-06-14

## 2017-07-05 ENCOUNTER — APPOINTMENT (OUTPATIENT)
Dept: SLEEP CENTER | Facility: HOSPITAL | Age: 82
End: 2017-07-05

## 2017-07-05 ENCOUNTER — OUTPATIENT (OUTPATIENT)
Dept: OUTPATIENT SERVICES | Facility: HOSPITAL | Age: 82
LOS: 1 days | End: 2017-07-05
Payer: COMMERCIAL

## 2017-07-05 DIAGNOSIS — G47.33 OBSTRUCTIVE SLEEP APNEA (ADULT) (PEDIATRIC): ICD-10-CM

## 2017-07-05 DIAGNOSIS — Z90.49 ACQUIRED ABSENCE OF OTHER SPECIFIED PARTS OF DIGESTIVE TRACT: Chronic | ICD-10-CM

## 2017-07-05 DIAGNOSIS — Z90.721 ACQUIRED ABSENCE OF OVARIES, UNILATERAL: Chronic | ICD-10-CM

## 2017-07-05 PROCEDURE — 95811 POLYSOM 6/>YRS CPAP 4/> PARM: CPT

## 2017-07-05 PROCEDURE — 95811 POLYSOM 6/>YRS CPAP 4/> PARM: CPT | Mod: 26

## 2017-07-12 ENCOUNTER — FORM ENCOUNTER (OUTPATIENT)
Age: 82
End: 2017-07-12

## 2019-04-04 PROBLEM — F32.9 MAJOR DEPRESSIVE DISORDER, SINGLE EPISODE, UNSPECIFIED: Chronic | Status: ACTIVE | Noted: 2017-05-04

## 2019-04-23 ENCOUNTER — APPOINTMENT (OUTPATIENT)
Dept: PULMONOLOGY | Facility: CLINIC | Age: 84
End: 2019-04-23
Payer: MEDICARE

## 2019-04-23 VITALS
BODY MASS INDEX: 44.15 KG/M2 | HEART RATE: 82 BPM | OXYGEN SATURATION: 93 % | DIASTOLIC BLOOD PRESSURE: 84 MMHG | TEMPERATURE: 97.8 F | HEIGHT: 65 IN | WEIGHT: 265 LBS | SYSTOLIC BLOOD PRESSURE: 149 MMHG

## 2019-04-23 DIAGNOSIS — E78.5 HYPERLIPIDEMIA, UNSPECIFIED: ICD-10-CM

## 2019-04-23 DIAGNOSIS — Z78.9 OTHER SPECIFIED HEALTH STATUS: ICD-10-CM

## 2019-04-23 DIAGNOSIS — F32.9 ANXIETY DISORDER, UNSPECIFIED: ICD-10-CM

## 2019-04-23 DIAGNOSIS — Z84.1 FAMILY HISTORY OF DISORDERS OF KIDNEY AND URETER: ICD-10-CM

## 2019-04-23 DIAGNOSIS — Z83.79 FAMILY HISTORY OF OTHER DISEASES OF THE DIGESTIVE SYSTEM: ICD-10-CM

## 2019-04-23 DIAGNOSIS — Z81.1 FAMILY HISTORY OF ALCOHOL ABUSE AND DEPENDENCE: ICD-10-CM

## 2019-04-23 DIAGNOSIS — F41.9 ANXIETY DISORDER, UNSPECIFIED: ICD-10-CM

## 2019-04-23 DIAGNOSIS — Z80.9 FAMILY HISTORY OF MALIGNANT NEOPLASM, UNSPECIFIED: ICD-10-CM

## 2019-04-23 PROCEDURE — 99204 OFFICE O/P NEW MOD 45 MIN: CPT

## 2019-04-23 RX ORDER — DULOXETINE HYDROCHLORIDE 30 MG/1
30 CAPSULE, DELAYED RELEASE PELLETS ORAL
Refills: 0 | Status: ACTIVE | COMMUNITY

## 2019-04-23 RX ORDER — CLONAZEPAM 0.5 MG/1
0.5 TABLET ORAL
Refills: 0 | Status: ACTIVE | COMMUNITY

## 2019-04-23 RX ORDER — ATORVASTATIN CALCIUM 10 MG/1
10 TABLET, FILM COATED ORAL
Refills: 0 | Status: ACTIVE | COMMUNITY

## 2019-04-23 NOTE — CONSULT LETTER
[Dear  ___] : Dear  [unfilled], [Consult Letter:] : I had the pleasure of evaluating your patient, [unfilled]. [Please see my note below.] : Please see my note below. [Sincerely,] : Sincerely, [Consult Closing:] : Thank you very much for allowing me to participate in the care of this patient.  If you have any questions, please do not hesitate to contact me. [FreeTextEntry3] : Celi Tracey MD\par \par [employer] [facility] [employer] [facility]\par Pulmonary, Critical Care, and Sleep Medicine\par

## 2019-04-23 NOTE — HISTORY OF PRESENT ILLNESS
[FreeTextEntry1] : 83 yo woman with depression and anxiety, hld, and moderate JUAREZ referred for JUAREZ management. She was diagnosed with JUAREZ in 2017 during surgical intervention where severe hypoxemia was noted. She had a subsequent sleep study and titration. Has been using CPAP since then and gets great clinical benefit, but does still have some daytime sleepiness. With CPAP use she denies snoring, apneas, and frequent nocturnal awakenings. Her PMD has been managing her equipment and JUAREZ but DME has been giving her a hard time with supply replacement. Has been having a hard time with weight loss. \par \par DME: [facility]\par Machine: Airsense 10, CPAP 6 cm H2O\par Mask: Eson 2 (?)\par \par PMD Dr. Gilda Kilpatrick [phone], [address] [place] [address] [Snoring] : no snoring [Obstructive Sleep Apnea] : obstructive sleep apnea [Witnessed Apneas] : no witnessed sleep apnea [Frequent Nocturnal Awakening] : no nocturnal awakening [Daytime Somnolence] : daytime somnolence [Unintentional Sleep while Active] : no unintentional sleep while active [Unintentional Sleep While Inactive] : unintentional sleep while inactive [Awakes Unrefreshed] : awakening unrefreshed [Awakes with Headache] : no headache upon awakening [Recent  Weight Gain] : recent weight gain [Awakening With Dry Mouth] : no dry mouth upon awakening [DIS] : no DIS [DMS] : no DMS [Hypersomnolence] : no hypersomnolence [Unusual Sleep Behavior] : no unusual sleep behavior [Sleep Paralysis] : no sleep paralysis [Cataplexy] : no cataplexy [Hypnagogic Hallucinations] : no hallucinations when falling asleep [Hypnopompic Hallucinations] : no hallucinations when awakening [Lower Extremity Discomfort] : no lower extremity discomfort in evening or at bedtime [AHI: ___ per hour] : Apnea-hypopnea index:  [unfilled] per hour [T90%: ___] : T90%: [unfilled]% [Mick desatuation%: ___] : Mick desaturation:  [unfilled]% [Date: ___] : the most recent therapeutic polysomnogram was completed [unfilled] [CPAP: ___ cmH2O] : CPAP: [unfilled] cmH2O

## 2019-04-23 NOTE — PHYSICAL EXAM
[General Appearance - Well Developed] : well developed [Normal Appearance] : normal appearance [General Appearance - Well Nourished] : well nourished [No Deformities] : no deformities [Normal Oropharynx] : normal oropharynx [Normal Conjunctiva] : the conjunctiva exhibited no abnormalities [III] : III [Enlarged Base of the Tongue] : enlargement of the base of the tongue [Neck Appearance] : the appearance of the neck was normal [Apical Impulse] : the apical impulse was normal [Heart Rate And Rhythm] : heart rate was normal and rhythm regular [] : no respiratory distress [Respiration, Rhythm And Depth] : normal respiratory rhythm and effort [Exaggerated Use Of Accessory Muscles For Inspiration] : no accessory muscle use [Auscultation Breath Sounds / Voice Sounds] : lungs were clear to auscultation bilaterally [Abnormal Walk] : normal gait [Involuntary Movements] : no involuntary movements were seen [Nail Clubbing] : no clubbing of the fingernails [Cyanosis, Localized] : no localized cyanosis [Skin Color & Pigmentation] : normal skin color and pigmentation [No Focal Deficits] : no focal deficits [Oriented To Time, Place, And Person] : oriented to person, place, and time [Impaired Insight] : insight and judgment were intact [Affect] : the affect was normal [Mood] : the mood was normal

## 2019-04-23 NOTE — ASSESSMENT
[FreeTextEntry1] : 85 yo with HLD, anxiety/depression and JUAREZ referred by Dr. Gilda Kilpatrick for JUAREZ management. Has moderate JUAREZ per CMS criteria and severe JUAREZ per AASM criteria. Endorses great benefit from CPAP use and uses it nightly. She is still, however, sleepy on many days of the week despite reported use of at least 6 hours pre night. Data download shows 80% adherence to >4 hours / night, average use of 6 hours 17 minutes, a therapeutic AHI of 1.6, and minimal leak. Efficacy and adherence are at goal. Residual sleepiness is an indication for stimulant therapy but this will be deferred for now. I will order new supplies for Ms. Livingston. The ramifications of JUAREZ and its treatment were discussed in detail. Weight loss counseling provided; referral to Dr. Tavarez provided. Follow up in 6 months or sooner if indicated. [Obesity, Class III, BMI 40-49.9 (E66.01)] : macrophage activation syndrome

## 2019-05-07 ENCOUNTER — OTHER (OUTPATIENT)
Age: 84
End: 2019-05-07

## 2019-05-13 ENCOUNTER — OUTPATIENT (OUTPATIENT)
Dept: OUTPATIENT SERVICES | Facility: HOSPITAL | Age: 84
LOS: 1 days | End: 2019-05-13
Payer: MEDICARE

## 2019-05-13 ENCOUNTER — APPOINTMENT (OUTPATIENT)
Dept: SLEEP CENTER | Facility: HOSPITAL | Age: 84
End: 2019-05-13

## 2019-05-13 DIAGNOSIS — G47.33 OBSTRUCTIVE SLEEP APNEA (ADULT) (PEDIATRIC): ICD-10-CM

## 2019-05-13 DIAGNOSIS — Z90.721 ACQUIRED ABSENCE OF OVARIES, UNILATERAL: Chronic | ICD-10-CM

## 2019-05-13 DIAGNOSIS — Z90.49 ACQUIRED ABSENCE OF OTHER SPECIFIED PARTS OF DIGESTIVE TRACT: Chronic | ICD-10-CM

## 2019-05-13 PROCEDURE — 95811 POLYSOM 6/>YRS CPAP 4/> PARM: CPT

## 2019-05-13 PROCEDURE — 95811 POLYSOM 6/>YRS CPAP 4/> PARM: CPT | Mod: 26

## 2019-05-14 ENCOUNTER — OTHER (OUTPATIENT)
Age: 84
End: 2019-05-14

## 2019-10-16 ENCOUNTER — APPOINTMENT (OUTPATIENT)
Dept: PULMONOLOGY | Facility: CLINIC | Age: 84
End: 2019-10-16

## 2019-10-16 ENCOUNTER — APPOINTMENT (OUTPATIENT)
Dept: PULMONOLOGY | Facility: CLINIC | Age: 84
End: 2019-10-16
Payer: MEDICARE

## 2019-10-16 VITALS
BODY MASS INDEX: 44.98 KG/M2 | HEIGHT: 65 IN | OXYGEN SATURATION: 95 % | SYSTOLIC BLOOD PRESSURE: 140 MMHG | DIASTOLIC BLOOD PRESSURE: 78 MMHG | WEIGHT: 270 LBS | TEMPERATURE: 97.9 F | HEART RATE: 84 BPM

## 2019-10-16 PROCEDURE — 99214 OFFICE O/P EST MOD 30 MIN: CPT

## 2019-10-16 NOTE — PHYSICAL EXAM
[General Appearance - Well Developed] : well developed [Normal Appearance] : normal appearance [General Appearance - Well Nourished] : well nourished [No Deformities] : no deformities [Normal Conjunctiva] : the conjunctiva exhibited no abnormalities [Normal Oropharynx] : normal oropharynx [Enlarged Base of the Tongue] : enlargement of the base of the tongue [III] : III [Neck Appearance] : the appearance of the neck was normal [Apical Impulse] : the apical impulse was normal [Heart Rate And Rhythm] : heart rate was normal and rhythm regular [] : no respiratory distress [Respiration, Rhythm And Depth] : normal respiratory rhythm and effort [Exaggerated Use Of Accessory Muscles For Inspiration] : no accessory muscle use [Auscultation Breath Sounds / Voice Sounds] : lungs were clear to auscultation bilaterally [Abnormal Walk] : normal gait [Involuntary Movements] : no involuntary movements were seen [Nail Clubbing] : no clubbing of the fingernails [Cyanosis, Localized] : no localized cyanosis [Skin Color & Pigmentation] : normal skin color and pigmentation [No Focal Deficits] : no focal deficits [Oriented To Time, Place, And Person] : oriented to person, place, and time [Impaired Insight] : insight and judgment were intact [Affect] : the affect was normal [Mood] : the mood was normal

## 2019-10-16 NOTE — REVIEW OF SYSTEMS
[EDS: ESS=____] : daytime somnolence: ESS=[unfilled] [Obesity] : obesity [Depression] : no depression [Anxious] : not anxious [Difficulty Initiating Sleep] : no difficulty falling asleep [Lower Extremity Discomfort] : no lower extremity discomfort [Late day/ Evening symptoms] : no late day/evening symptoms [Unusual Sleep Behavior] : no unusual sleep behavior [Hypersomnolence] : not sleeping much more than usual [Cataplexy] :  no cataplexy [Negative] : Musculoskeletal

## 2019-10-16 NOTE — ASSESSMENT
[FreeTextEntry1] : 85 yo woman with depression and anxiety, hld, and moderate JUAREZ here for follow up. Data download shows 88% adherence to more than 4 hours of nightly use, average use of 7 hours 6 minutes, therapeutic AHI of 0.6, and moderate leak. She does not perceive the leak. Efficacy and adherence are at goal. Gets subjective benefit from use. Encouraged continued adherence. The ramifications of JUAREZ and its treatment were extensively discussed. Referred her to Dr. Robles Tavarez for weight loss management. Follow up in 6 months or sooner if indicated.

## 2019-10-16 NOTE — HISTORY OF PRESENT ILLNESS
[Obstructive Sleep Apnea] : obstructive sleep apnea [Daytime Somnolence] : daytime somnolence [Unintentional Sleep While Inactive] : unintentional sleep while inactive [Awakes Unrefreshed] : awakening unrefreshed [Recent  Weight Gain] : recent weight gain [AHI: ___ per hour] : Apnea-hypopnea index:  [unfilled] per hour [T90%: ___] : T90%: [unfilled]% [Mick desatuation%: ___] : Mick desaturation:  [unfilled]% [Date: ___] : the most recent therapeutic polysomnogram was completed [unfilled] [CPAP: ___ cmH2O] : CPAP: [unfilled] cmH2O [FreeTextEntry1] : 85 yo woman with depression and anxiety, hld, and moderate JUAREZ here for follow up. Perceives great benefit from regular CPAP use. Has been having a hard time with weight loss. Gets nocturnal sciatica pain which wakes her up. Has never tried any formal weight loss programs. Gets regular mask/tubing replacements.\par \par DME: [employer]\par Machine: Airsense 10, CPAP 6 cm H2O\par Mask: Eson 2 (?)\par \par PMD Dr. Gilda Kilpatrick [phone], [address] [place] [address] [Snoring] : no snoring [Witnessed Apneas] : no witnessed sleep apnea [Frequent Nocturnal Awakening] : no nocturnal awakening [Unintentional Sleep while Active] : no unintentional sleep while active [Awakes with Headache] : no headache upon awakening [Awakening With Dry Mouth] : no dry mouth upon awakening [DIS] : no DIS [DMS] : no DMS [Unusual Sleep Behavior] : no unusual sleep behavior [Hypersomnolence] : no hypersomnolence [Cataplexy] : no cataplexy [Sleep Paralysis] : no sleep paralysis [Hypnagogic Hallucinations] : no hallucinations when falling asleep [Hypnopompic Hallucinations] : no hallucinations when awakening [Lower Extremity Discomfort] : no lower extremity discomfort in evening or at bedtime

## 2019-12-18 ENCOUNTER — APPOINTMENT (OUTPATIENT)
Dept: PULMONOLOGY | Facility: CLINIC | Age: 84
End: 2019-12-18
Payer: MEDICARE

## 2019-12-18 VITALS
SYSTOLIC BLOOD PRESSURE: 145 MMHG | BODY MASS INDEX: 44.98 KG/M2 | WEIGHT: 270 LBS | TEMPERATURE: 97.4 F | OXYGEN SATURATION: 93 % | DIASTOLIC BLOOD PRESSURE: 85 MMHG | HEART RATE: 100 BPM | HEIGHT: 65 IN

## 2019-12-18 PROCEDURE — 99214 OFFICE O/P EST MOD 30 MIN: CPT

## 2019-12-18 NOTE — PHYSICAL EXAM
[General Appearance - Well Developed] : well developed [Normal Appearance] : normal appearance [No Deformities] : no deformities [General Appearance - Well Nourished] : well nourished [Normal Oropharynx] : normal oropharynx [Enlarged Base of the Tongue] : enlargement of the base of the tongue [Normal Conjunctiva] : the conjunctiva exhibited no abnormalities [III] : III [Neck Appearance] : the appearance of the neck was normal [Apical Impulse] : the apical impulse was normal [Heart Rate And Rhythm] : heart rate was normal and rhythm regular [Respiration, Rhythm And Depth] : normal respiratory rhythm and effort [] : no respiratory distress [Auscultation Breath Sounds / Voice Sounds] : lungs were clear to auscultation bilaterally [Involuntary Movements] : no involuntary movements were seen [Exaggerated Use Of Accessory Muscles For Inspiration] : no accessory muscle use [Abnormal Walk] : normal gait [Skin Color & Pigmentation] : normal skin color and pigmentation [Nail Clubbing] : no clubbing of the fingernails [Cyanosis, Localized] : no localized cyanosis [Oriented To Time, Place, And Person] : oriented to person, place, and time [Impaired Insight] : insight and judgment were intact [No Focal Deficits] : no focal deficits [Mood] : the mood was normal [Affect] : the affect was normal

## 2019-12-18 NOTE — HISTORY OF PRESENT ILLNESS
[FreeTextEntry1] : 83 yo woman with depression and anxiety, hld, and moderate JUAREZ here for follow up. Perceives great benefit from regular CPAP use. Has been having a hard time with weight loss. Gets nocturnal sciatica pain which wakes her up. Has never tried any formal weight loss programs. Gets regular mask/tubing replacements. Has been considered non compliant by CMS criteria because no F2F available from the initial 90 day compliance period; she was very sick at that time and her CPAP use was not mentioned in her PMD's notes. She is indicated for repeat testing. \par \par DME: [employer]\par Machine: Airsense 10, CPAP 6 cm H2O\par Mask: Eson 2 (?)\par \par PMD Dr. Gilda Kilpatrick [phone], [address] [place] [address] [Obstructive Sleep Apnea] : obstructive sleep apnea [Snoring] : no snoring [Witnessed Apneas] : no witnessed sleep apnea [Frequent Nocturnal Awakening] : no nocturnal awakening [Daytime Somnolence] : daytime somnolence [Unintentional Sleep while Active] : no unintentional sleep while active [Unintentional Sleep While Inactive] : unintentional sleep while inactive [Awakes Unrefreshed] : awakening unrefreshed [Awakes with Headache] : no headache upon awakening [Awakening With Dry Mouth] : no dry mouth upon awakening [Recent  Weight Gain] : recent weight gain [DIS] : no DIS [DMS] : no DMS [Unusual Sleep Behavior] : no unusual sleep behavior [Hypersomnolence] : no hypersomnolence [Cataplexy] : no cataplexy [Sleep Paralysis] : no sleep paralysis [Hypnagogic Hallucinations] : no hallucinations when falling asleep [Hypnopompic Hallucinations] : no hallucinations when awakening [Lower Extremity Discomfort] : no lower extremity discomfort in evening or at bedtime [AHI: ___ per hour] : Apnea-hypopnea index:  [unfilled] per hour [T90%: ___] : T90%: [unfilled]% [Mick desatuation%: ___] : Mick desaturation:  [unfilled]% [Date: ___] : the most recent therapeutic polysomnogram was completed [unfilled] [CPAP: ___ cmH2O] : CPAP: [unfilled] cmH2O

## 2019-12-18 NOTE — ASSESSMENT
[FreeTextEntry1] : 83 yo woman with depression and anxiety, hld, and moderate JUAREZ here for follow up.Ms. Livingston is indicated for repeat testing to adhere to CMS guidelines. She receives great benefit from nightly CPAP use. Referred to the [facility] for a split diagnostic PSG/CPAP titration study. She will stop CPAP use for 3 nights prior to the repeat testing in order to avoid a false negative. New equipment will be ordered subsequently. The ramifications of JUAREZ and its treatment were discussed in detail. Follow up within 6 weeks of CPAP use with the new equipment.

## 2019-12-26 ENCOUNTER — OUTPATIENT (OUTPATIENT)
Dept: OUTPATIENT SERVICES | Facility: HOSPITAL | Age: 84
LOS: 1 days | End: 2019-12-26
Payer: MEDICARE

## 2019-12-26 ENCOUNTER — APPOINTMENT (OUTPATIENT)
Dept: SLEEP CENTER | Facility: HOSPITAL | Age: 84
End: 2019-12-26

## 2019-12-26 DIAGNOSIS — Z90.49 ACQUIRED ABSENCE OF OTHER SPECIFIED PARTS OF DIGESTIVE TRACT: Chronic | ICD-10-CM

## 2019-12-26 DIAGNOSIS — Z90.721 ACQUIRED ABSENCE OF OVARIES, UNILATERAL: Chronic | ICD-10-CM

## 2019-12-26 DIAGNOSIS — G47.33 OBSTRUCTIVE SLEEP APNEA (ADULT) (PEDIATRIC): ICD-10-CM

## 2019-12-26 PROCEDURE — 95810 POLYSOM 6/> YRS 4/> PARAM: CPT | Mod: 26

## 2019-12-26 PROCEDURE — 95810 POLYSOM 6/> YRS 4/> PARAM: CPT

## 2020-09-22 ENCOUNTER — APPOINTMENT (OUTPATIENT)
Dept: PULMONOLOGY | Facility: CLINIC | Age: 85
End: 2020-09-22
Payer: MEDICARE

## 2020-09-22 PROCEDURE — 99443: CPT | Mod: 95

## 2020-09-22 NOTE — PHYSICAL EXAM
[FreeTextEntry1] : *****full physical exam could not be performed due to this being a TeleHealth visit

## 2020-09-22 NOTE — ASSESSMENT
[FreeTextEntry1] : 83 yo woman with depression and anxiety, hld, and severe JUAREZ following up. Has been using her CPAP regularly and perceives subjective benefit. Data download from 6/24 to 9/21 shows 76% adherence, average use of 6 hours 15 minutes, therapeutic AHI of 0.5, and moderate leak. Efficacy and adherence are at goal. Continued use encouraged. The ramifications of JUAREZ and its use were discussed. Follow up in 6 months or sooner if needed.

## 2020-09-22 NOTE — HISTORY OF PRESENT ILLNESS
[Home] : at home, [unfilled] , at the time of the visit. [Medical Office: ([facility])___] : at the medical office located in  [Verbal consent obtained from patient] : the patient, [unfilled] [FreeTextEntry1] : 85 yo woman with depression and anxiety, hld, and moderate JUAREZ here for follow up. Perceives great benefit from regular CPAP use. Has been having a hard time with weight loss. Gets nocturnal sciatica pain which wakes her up. Has never tried any formal weight loss programs. Gets regular mask/tubing replacements. Has been considered non compliant by CMS criteria because no F2F available from the initial 90 day compliance period; she was very sick at that time and her CPAP use was not mentioned in her PMD's notes. She is indicated for repeat testing. \par \par \par 9/22/2020\par Has been using CPAP with benefit. No issues with mask. Subjective benefit perceived.\par \par DME: [employer]\par Machine: Airsense 10, CPAP 6 cm H2O\par Mask: Eson 2 (?)\par \par PMD Dr. Gilda Kilpatrick [phone], [address] [place] [address] [Obstructive Sleep Apnea] : obstructive sleep apnea [Snoring] : no snoring [Witnessed Apneas] : no witnessed sleep apnea [Frequent Nocturnal Awakening] : no nocturnal awakening [Daytime Somnolence] : daytime somnolence [Unintentional Sleep while Active] : no unintentional sleep while active [Unintentional Sleep While Inactive] : unintentional sleep while inactive [Awakes Unrefreshed] : awakening unrefreshed [Awakes with Headache] : no headache upon awakening [Awakening With Dry Mouth] : no dry mouth upon awakening [Recent  Weight Gain] : recent weight gain [DIS] : no DIS [DMS] : no DMS [Unusual Sleep Behavior] : no unusual sleep behavior [Hypersomnolence] : no hypersomnolence [Cataplexy] : no cataplexy [Sleep Paralysis] : no sleep paralysis [Hypnagogic Hallucinations] : no hallucinations when falling asleep [Hypnopompic Hallucinations] : no hallucinations when awakening [Lower Extremity Discomfort] : no lower extremity discomfort in evening or at bedtime [AHI: ___ per hour] : Apnea-hypopnea index:  [unfilled] per hour [T90%: ___] : T90%: [unfilled]% [Mick desatuation%: ___] : Mick desaturation:  [unfilled]% [Date: ___] : the most recent therapeutic polysomnogram was completed [unfilled] [CPAP: ___ cmH2O] : CPAP: [unfilled] cmH2O

## 2021-02-15 NOTE — DIETITIAN INITIAL EVALUATION ADULT. - NUTRITION INTERVENTION
February 17, 2021     Miriam Tabares MD  [address]    Patient: Marie Davila   YOB: 1966   Date of Visit: 2/15/2021       Dear Dr Modesto Montague: Thank you for referring Marie Davila to me for evaluation  Below are my notes for this consultation  If you have questions, please do not hesitate to call me  I look forward to following your patient along with you  Sincerely,        Angela Jamil DPM        CC: No Recipients  Isaac Titus  2/17/2021  8:59 AM  Sign when Signing Visit        PATIENT:  Marie Davila    1966      ASSESSMENT:     1  Type 2 diabetes mellitus with diabetic neuropathy, with long-term current use of insulin (HCC)  VAS lower limb arterial duplex, complete bilateral    DULoxetine (CYMBALTA) 30 mg delayed release capsule   2  Neuropathic pain of foot, unspecified laterality  DULoxetine (CYMBALTA) 30 mg delayed release capsule   3  Peripheral vascular disease, unspecified ([facility] [address] )  VAS lower limb arterial duplex, complete bilateral         PLAN:  1  Patient was counseled on the condition and diagnosis  2   Educated disease prevention and risks related to diabetes  3   Educated proper daily foot care and exam   Instructed proper skin care / protection and footwear  Instructed to identify any signs of infection and related foot problem  4   She has painful neuropathy in her feet  Instructed her to control tight management of BS to decreased symptoms  Awaits endocrinology evaluation  5  Will add Cymbalta  Consider pain management consultation  6  Discussed possible surgical nerve decompression if her BS is under better control  7  She has decreased pedal pulses and scheduled her for LE arterial study  8  The patient will return in 1 month  Subjective:          HPI  The patient presents with chief complaint of painful neuropathy in her feet    Increased neurologic pain in the last couple of years with shooting, throbbing, and pins/needles  She also has numbness and cold sensation  Gabapentin takes a edge off of her pain  Her pain is about 8-9 out of 10  Pain can keep her up at night  She was seen by another podiatrist and neurologist for her condition  The patient has diabetes for 20 years  The blood glucose is under control and the last HbA1c was 12 5  The patient denied any history of diabetic foot ulcer, related foot infection, or amputation  The following portions of the patient's history were reviewed and updated as appropriate: allergies, current medications, past family history, past medical history, past social history, past surgical history and problem list   All pertinent labs and images were reviewed  Past Medical History  Past Medical History:   Diagnosis Date    Asthma     Diabetes mellitus ([address] )     Hyperlipidemia     Hypertension        Past Surgical History  History reviewed  No pertinent surgical history  Allergies:  Patient has no known allergies  Medications:  Current Outpatient Medications   Medication Sig Dispense Refill    atorvastatin (LIPITOR) 40 mg tablet       B-D UF III MINI PEN NEEDLES 31G X 5 MM MISC       Blood Glucose Monitoring Suppl (OneTouch Verio Reflect) w/Device KIT       DULoxetine (CYMBALTA) 30 mg delayed release capsule Take 1 capsule (30 mg total) by mouth daily 30 capsule 0    fluconazole (DIFLUCAN) 150 mg tablet       gabapentin (NEURONTIN) 600 MG tablet       insulin lispro (HumaLOG) 100 units/mL injection pen       Lancets (OneTouch Delica Plus OTTLSX29R) MISC       Lantus SoloStar 100 units/mL injection pen       lisinopril-hydrochlorothiazide (PRINZIDE,ZESTORETIC) 20-25 MG per tablet       OneTouch Verio test strip        No current facility-administered medications for this visit  Social History:  Social History     Socioeconomic History    Marital status:       Spouse name: None    Number of children: None    Years of education: None  Highest education level: None   Occupational History    None   Social Needs    Financial resource strain: None    Food insecurity     Worry: None     Inability: None    Transportation needs     Medical: None     Non-medical: None   Tobacco Use    Smoking status: Former Smoker    Smokeless tobacco: Never Used   Substance and Sexual Activity    Alcohol use: None    Drug use: None    Sexual activity: None   Lifestyle    Physical activity     Days per week: None     Minutes per session: None    Stress: None   Relationships    Social connections     Talks on phone: None     Gets together: None     Attends Druze service: None     Active member of club or organization: None     Attends meetings of clubs or organizations: None     Relationship status: None    Intimate partner violence     Fear of current or ex partner: None     Emotionally abused: None     Physically abused: None     Forced sexual activity: None   Other Topics Concern    None   Social History Narrative    None        Review of Systems   Constitutional: Negative for appetite change, chills and fever  HENT: Negative for sore throat  Respiratory: Negative for cough and shortness of breath  Cardiovascular: Negative for chest pain  Gastrointestinal: Negative for diarrhea, nausea and vomiting  Musculoskeletal: Negative for joint swelling  Skin: Negative for rash and wound  Neurological: Positive for numbness  Negative for weakness  Hematological: Does not bruise/bleed easily  Psychiatric/Behavioral: Negative for behavioral problems and confusion  Objective:      /80   Pulse 101   Temp 98 7 °F (37 1 °C)   Ht 5' 6" (1 676 m)   Wt 93 8 kg (206 lb 12 8 oz)   BMI 33 38 kg/m²          Physical Exam  Vitals signs reviewed  Constitutional:       General: She is not in acute distress  Appearance: Normal appearance  She is not ill-appearing  HENT:      Head: Normocephalic and atraumatic     Eyes: Extraocular Movements: Extraocular movements intact  Neck:      Musculoskeletal: Normal range of motion and neck supple  Cardiovascular:      Rate and Rhythm: Normal rate and regular rhythm  Pulses: Pulses are weak  Dorsalis pedis pulses are 1+ on the right side and 1+ on the left side  Posterior tibial pulses are 0 on the right side and 0 on the left side  Comments: No ischemia  Pulmonary:      Effort: Pulmonary effort is normal  No respiratory distress  Musculoskeletal:         General: No swelling, tenderness, deformity or signs of injury  Right lower leg: No edema  Left lower leg: No edema  Right foot: No Charcot foot or foot drop  Left foot: No Charcot foot or foot drop  Feet:      Right foot:      Protective Sensation: 10 sites tested  0 sites sensed  Skin integrity: Dry skin present  No ulcer or erythema  Left foot:      Protective Sensation: 10 sites tested  0 sites sensed  Skin integrity: Dry skin present  No ulcer or erythema  Skin:     Findings: No erythema or rash  Neurological:      General: No focal deficit present  Mental Status: She is alert and oriented to person, place, and time  Cranial Nerves: No cranial nerve deficit  Sensory: Sensory deficit present  Motor: No weakness  Coordination: Coordination normal    Psychiatric:         Mood and Affect: Mood normal          Behavior: Behavior normal          Thought Content: Thought content normal          Judgment: Judgment normal              Diabetic Foot Exam    Patient's shoes and socks removed  Right Foot/Ankle   Right Foot Inspection  Skin Exam: skin intact and dry skin no erythema, no pre-ulcer and no ulcer                          Toe Exam: no swelling, no tenderness, erythema and  no right toe deformity  Sensory   Vibration: absent  Proprioception: intact   Monofilament testing: absent  Vascular  Capillary refills: < 3 seconds  The right DP pulse is 1+  The right PT pulse is 0  Left Foot/Ankle  Left Foot Inspection  Skin Exam: skin intact and dry skinno erythema, no pre-ulcer and no ulcer                         Toe Exam: no swelling, no tenderness, no erythema and no left toe deformity                   Sensory   Vibration: absent  Proprioception: intact  Monofilament: absent  Vascular  Capillary refills: < 3 seconds  The left DP pulse is 1+  The left PT pulse is 0  Assign Risk Category:  No deformity present;  Loss of protective sensation; Weak pulses       Risk: 2 Meals and Snack

## 2021-03-31 ENCOUNTER — NON-APPOINTMENT (OUTPATIENT)
Age: 86
End: 2021-03-31

## 2021-04-02 ENCOUNTER — APPOINTMENT (OUTPATIENT)
Dept: PULMONOLOGY | Facility: CLINIC | Age: 86
End: 2021-04-02
Payer: MEDICARE

## 2021-04-02 DIAGNOSIS — G47.33 OBSTRUCTIVE SLEEP APNEA (ADULT) (PEDIATRIC): ICD-10-CM

## 2021-04-02 PROCEDURE — 99443: CPT | Mod: 95

## 2021-04-03 PROBLEM — G47.33 OBSTRUCTIVE SLEEP APNEA, ADULT: Status: ACTIVE | Noted: 2019-04-23

## 2021-04-03 NOTE — REVIEW OF SYSTEMS
[EDS: ESS=____] : daytime somnolence: ESS=[unfilled] [Obesity] : obesity [Depression] : no depression [Anxious] : not anxious [Difficulty Initiating Sleep] : no difficulty falling asleep [Lower Extremity Discomfort] : no lower extremity discomfort [Unusual Sleep Behavior] : no unusual sleep behavior [Late day/ Evening symptoms] : no late day/evening symptoms [Hypersomnolence] : not sleeping much more than usual [Cataplexy] :  no cataplexy [Negative] : Musculoskeletal

## 2021-04-03 NOTE — ASSESSMENT
[FreeTextEntry1] : REVIEWED: \par [employer] data 1/2/2021 to 4/1/2021\par 88% adherence to more than 4 hours of use per day\par average use of 6 hours\par therapeutic AHI of 0.5\par minimal leak\par \par 85 yo woman with depression and anxiety, hld, and severe JUAREZ following up. Has been using her CPAP regularly and perceives subjective benefit.  Efficacy and adherence are at goal. Continued use encouraged. The ramifications of JUAREZ and its use were discussed. Referred to Dr. Robles Tavarez for weight loss management. Follow up in 6 months or sooner if needed. \par  [Obesity, Class III, BMI 40-49.9 (E66.01)] : macrophage activation syndrome

## 2021-04-03 NOTE — HISTORY OF PRESENT ILLNESS
[Home] : at home, [unfilled] , at the time of the visit. [Medical Office: ([facility])___] : at the medical office located in  [Verbal consent obtained from patient] : the patient, [unfilled] [FreeTextEntry1] : 83 yo woman with depression and anxiety, hld, and moderate JUAREZ here for follow up. Perceives great benefit from regular CPAP use. Has been having a hard time with weight loss. Gets nocturnal sciatica pain which wakes her up. Has never tried any formal weight loss programs. Gets regular mask/tubing replacements. Has been considered non compliant by CMS criteria because no F2F available from the initial 90 day compliance period; she was very sick at that time and her CPAP use was not mentioned in her PMD's notes. She is indicated for repeat testing. \par \par \par 9/22/2020\par Has been using CPAP with benefit. No issues with mask. Subjective benefit perceived.\par \par 4/2/2021\par Doing well with CPAP. Benefit perceived.\par \par DME: [facility]\par Machine: Airsense 10, CPAP 6 cm H2O\par Mask: Eson 2 (?)\par \par PMD Dr. Gilad Kilpatrick [phone], [address] [place] [address] [Obstructive Sleep Apnea] : obstructive sleep apnea [Snoring] : no snoring [Witnessed Apneas] : no witnessed sleep apnea [Frequent Nocturnal Awakening] : no nocturnal awakening [Daytime Somnolence] : daytime somnolence [Unintentional Sleep while Active] : no unintentional sleep while active [Unintentional Sleep While Inactive] : unintentional sleep while inactive [Awakes Unrefreshed] : awakening unrefreshed [Awakening With Dry Mouth] : no dry mouth upon awakening [Awakes with Headache] : no headache upon awakening [Recent  Weight Gain] : recent weight gain [DIS] : no DIS [DMS] : no DMS [Unusual Sleep Behavior] : no unusual sleep behavior [Hypersomnolence] : no hypersomnolence [Cataplexy] : no cataplexy [Sleep Paralysis] : no sleep paralysis [Hypnagogic Hallucinations] : no hallucinations when falling asleep [Hypnopompic Hallucinations] : no hallucinations when awakening [Lower Extremity Discomfort] : no lower extremity discomfort in evening or at bedtime [AHI: ___ per hour] : Apnea-hypopnea index:  [unfilled] per hour [T90%: ___] : T90%: [unfilled]% [Mick desatuation%: ___] : Mick desaturation:  [unfilled]% [Date: ___] : the most recent therapeutic polysomnogram was completed [unfilled] [CPAP: ___ cmH2O] : CPAP: [unfilled] cmH2O

## 2021-05-14 ENCOUNTER — APPOINTMENT (OUTPATIENT)
Dept: BARIATRICS/WEIGHT MGMT | Facility: CLINIC | Age: 86
End: 2021-05-14

## 2021-07-13 ENCOUNTER — APPOINTMENT (OUTPATIENT)
Dept: BARIATRICS/WEIGHT MGMT | Facility: CLINIC | Age: 86
End: 2021-07-13

## 2024-02-07 ENCOUNTER — APPOINTMENT (OUTPATIENT)
Dept: PULMONOLOGY | Facility: CLINIC | Age: 89
End: 2024-02-07
Payer: MEDICARE

## 2024-02-07 VITALS
SYSTOLIC BLOOD PRESSURE: 152 MMHG | WEIGHT: 274 LBS | HEIGHT: 65 IN | BODY MASS INDEX: 45.65 KG/M2 | DIASTOLIC BLOOD PRESSURE: 83 MMHG | OXYGEN SATURATION: 96 % | HEART RATE: 74 BPM

## 2024-02-07 DIAGNOSIS — R06.02 SHORTNESS OF BREATH: ICD-10-CM

## 2024-02-07 PROCEDURE — 94729 DIFFUSING CAPACITY: CPT

## 2024-02-07 PROCEDURE — 94060 EVALUATION OF WHEEZING: CPT

## 2024-02-07 PROCEDURE — 99214 OFFICE O/P EST MOD 30 MIN: CPT | Mod: 25

## 2024-02-07 PROCEDURE — 94726 PLETHYSMOGRAPHY LUNG VOLUMES: CPT

## 2024-02-07 PROCEDURE — ZZZZZ: CPT

## 2024-02-07 RX ORDER — FLUTICASONE PROPIONATE 50 UG/1
50 SPRAY, METERED NASAL
Qty: 3 | Refills: 3 | Status: ACTIVE | COMMUNITY
Start: 2024-02-07 | End: 1900-01-01

## 2024-02-07 RX ORDER — AZELASTINE HYDROCHLORIDE 137 UG/1
137 SPRAY, METERED NASAL TWICE DAILY
Qty: 1 | Refills: 3 | Status: ACTIVE | COMMUNITY
Start: 2024-02-07 | End: 1900-01-01

## 2024-02-07 NOTE — ASSESSMENT
[FreeTextEntry1] : REVIEWED: [employer] data 5/2021 to 5/2022 74% adherence to more than 4 hours of use per day average use of 6 hours therapeutic AHI of 0.4 minimal leak  PFTs 2/7/2024: FEV1 92, FEV1/FVC 60, , DLCO 78  83 yo woman with depression and anxiety, hld, and severe JUAREZ following up. Has been using her CPAP regularly and perceives subjective benefit. Efficacy and adherence are at goal on last report however no recent reports for the past year; has an older machine. She will need a new machine and an updated PSG. We have ordered PSG to be done in lab and will re-prescribe the newer CPAP machine when PSG resulted. Her SOB with exertion and wheezing may be multifactorial including PND and possible asthma/small airways disease as well as weight. Prescribed azelastine and flonase nasal sprays to be taken twice daily for at least 4 weeks. PFTs with mild obstruction. Will defer BDs until completes the PND treatment trial.  Follow up after PSG and within 6 to 8 weeks of starting nasal sprays.

## 2024-02-07 NOTE — CONSULT LETTER
[Dear  ___] : Dear  [unfilled], [Courtesy Letter:] : I had the pleasure of seeing your patient, [unfilled], in my office today. [Please see my note below.] : Please see my note below. [Consult Closing:] : Thank you very much for allowing me to participate in the care of this patient.  If you have any questions, please do not hesitate to contact me. [Sincerely,] : Sincerely, [FreeTextEntry3] : Celi Tracey MD  [employer] at [facility] Pulmonary, Critical Care, and Sleep Medicine

## 2024-02-07 NOTE — HISTORY OF PRESENT ILLNESS
[Never] : never [TextBox_4] : 85 yo woman with depression and anxiety, hld, and moderate JUAREZ here for follow up. Perceives great benefit from regular CPAP use. Has been having a hard time with weight loss. Gets nocturnal sciatica pain which wakes her up. Has never tried any formal weight loss programs. Gets regular mask/tubing replacements. Has been considered non compliant by CMS criteria because no F2F available from the initial 90 day compliance period; she was very sick at that time and her CPAP use was not mentioned in her PMD's notes. She is indicated for repeat testing.  9/22/2020 Has been using CPAP with benefit. No issues with mask. Subjective benefit perceived.  4/2/2021 Doing well with CPAP. Benefit perceived.  2/7/2024 No airview data since 2022. She says she uses her CPAP every night and uses it all night. She feels her sleep apnea is well controlled but she feels like she now has occasional wheezing and SOB with exertion. She states this wheezing has been going on for about a month. She says when she is walking with her walker she has to stop to catch her breath after a couple of blocks. Never smoker. History of asthma as a child. 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 Allergies to penicillin and sulfa. Never been on inhalers.   DME: [facility] Machine: Airsense 10, CPAP 6 cm H2O Mask: Eson 2 (?)  PMD Dr. Gilda Kilpatrick [phone], [address] [place] [address] [ESS] : 3

## 2024-02-22 ENCOUNTER — APPOINTMENT (OUTPATIENT)
Dept: SLEEP CENTER | Facility: HOSPITAL | Age: 89
End: 2024-02-22

## 2024-02-22 ENCOUNTER — OUTPATIENT (OUTPATIENT)
Dept: OUTPATIENT SERVICES | Facility: HOSPITAL | Age: 89
LOS: 1 days | End: 2024-02-22
Payer: MEDICARE

## 2024-02-22 DIAGNOSIS — Z90.721 ACQUIRED ABSENCE OF OVARIES, UNILATERAL: Chronic | ICD-10-CM

## 2024-02-22 DIAGNOSIS — G47.33 OBSTRUCTIVE SLEEP APNEA (ADULT) (PEDIATRIC): ICD-10-CM

## 2024-02-22 DIAGNOSIS — Z90.49 ACQUIRED ABSENCE OF OTHER SPECIFIED PARTS OF DIGESTIVE TRACT: Chronic | ICD-10-CM

## 2024-02-22 PROCEDURE — 95810 POLYSOM 6/> YRS 4/> PARAM: CPT | Mod: 26

## 2024-02-22 PROCEDURE — 95810 POLYSOM 6/> YRS 4/> PARAM: CPT

## 2024-02-25 ENCOUNTER — TRANSCRIPTION ENCOUNTER (OUTPATIENT)
Age: 89
End: 2024-02-25

## 2024-03-26 ENCOUNTER — APPOINTMENT (OUTPATIENT)
Dept: PULMONOLOGY | Facility: CLINIC | Age: 89
End: 2024-03-26
Payer: MEDICARE

## 2024-03-26 VITALS
BODY MASS INDEX: 44.65 KG/M2 | TEMPERATURE: 97.4 F | DIASTOLIC BLOOD PRESSURE: 83 MMHG | SYSTOLIC BLOOD PRESSURE: 143 MMHG | OXYGEN SATURATION: 95 % | WEIGHT: 268 LBS | HEART RATE: 87 BPM | HEIGHT: 65 IN

## 2024-03-26 DIAGNOSIS — R09.82 POSTNASAL DRIP: ICD-10-CM

## 2024-03-26 DIAGNOSIS — E66.01 MORBID (SEVERE) OBESITY DUE TO EXCESS CALORIES: ICD-10-CM

## 2024-03-26 DIAGNOSIS — G47.33 OBSTRUCTIVE SLEEP APNEA (ADULT) (PEDIATRIC): ICD-10-CM

## 2024-03-26 PROCEDURE — 99214 OFFICE O/P EST MOD 30 MIN: CPT

## 2024-03-26 PROCEDURE — G2211 COMPLEX E/M VISIT ADD ON: CPT

## 2024-03-27 PROBLEM — E66.01 OBESITY, CLASS III, BMI 40-49.9 (MORBID OBESITY): Status: ACTIVE | Noted: 2019-04-23

## 2024-03-27 PROBLEM — G47.33 OBSTRUCTIVE SLEEP APNEA: Status: ACTIVE | Noted: 2019-04-23

## 2024-03-27 PROBLEM — R09.82 PND (POST-NASAL DRIP): Status: ACTIVE | Noted: 2024-02-07

## 2024-03-27 NOTE — ASSESSMENT
[FreeTextEntry1] : REVIEWED: [employer] data 5/2021 to 5/2022 74% adherence to more than 4 hours of use per day average use of 6 hours therapeutic AHI of 0.4 minimal leak  PFTs 2/7/2024: FEV1 92, FEV1/FVC 60, , DLCO 78 PSG 3/11/2024: AHI 54.3 (AASM); AHI 41 (CMS); t88 17.6 minutes  87 yo woman, never smoker, with depression and anxiety, hld, and severe JUAREZ following up for JUAREZ and dyspnea.   Sleep: Has been using her CPAP regularly and perceives subjective benefit. Some daytime somnolence. Efficacy and adherence are at goal on last report however no recent reports for the past year; has an older machine. PSG today re-demonstrated severe JUAREZ. New CPAP machine ordered today through [facility] with pressure settings between 5 and 20 cm H2O and home mask fitting. Will review data from new machine to ensure AHI is therapeutic.  Dyspnea: Patient's SOB with exertion and wheezing improved significantly after treatment with Azelastine/Flonase. SOB with exertion and wheezing may be multifactorial including PND and possible asthma/small airways disease as well as weight. PFTs with mild obstruction; patient would like to defer trial of inhaler given improvement with nasal sprays.   Follow-up 10 weeks after starting new PAP machine.

## 2024-03-27 NOTE — END OF VISIT
[FreeTextEntry3] :   I, Celi Tracey MD, personally performed the evaluation and management (E/M) services for this patient. That E/M includes conducting the clinically appropriate initial history &/or exam, assessing all conditions, and establishing the plan of care. I have also independently reviewed the medical records and imaging at the time of this office visit, and discussed the above mentioned images with interpretations with the patient. Today, TRACY Richey was here to participate in the visit & follow plan of care established by me.

## 2024-03-27 NOTE — HISTORY OF PRESENT ILLNESS
[Never] : never [TextBox_4] : 83 yo woman with depression and anxiety, hld, and moderate JUAREZ here for follow up. Perceives great benefit from regular CPAP use. Has been having a hard time with weight loss. Gets nocturnal sciatica pain which wakes her up. Has never tried any formal weight loss programs. Gets regular mask/tubing replacements. Has been considered non compliant by CMS criteria because no F2F available from the initial 90 day compliance period; she was very sick at that time and her CPAP use was not mentioned in her PMD's notes. She is indicated for repeat testing.  9/22/2020 Has been using CPAP with benefit. No issues with mask. Subjective benefit perceived.  4/2/2021 Doing well with CPAP. Benefit perceived.  2/7/2024 No airview data since 2022. She says she uses her CPAP every night and uses it all night. She feels her sleep apnea is well controlled but she feels like she now has occasional wheezing and SOB with exertion. She states this wheezing has been going on for about a month. She says when she is walking with her walker she has to stop to catch her breath after a couple of blocks. Never smoker. History of asthma as a child. 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 Allergies to penicillin and sulfa. Never been on inhalers.  3/26/2024 Had PSG; here to discuss results and for new CPAP machine. Started Flonase/Azelastine and has had major improvement in dyspnea as well as wheeze. Does endorse some daytime somnolence despite PAP compliance. Started Zepbound injection for weight loss; has lost 10 pounds.  DME: [facility] Machine: Airsense 10, CPAP 6 cm H2O Mask: Eson 2 (?)  PMD Dr. Gilda Kilpatrick [phone], [address] [place] [address].   [ESS] : 3

## 2024-03-27 NOTE — PHYSICAL EXAM
[No Acute Distress] : no acute distress [No Resp Distress] : no resp distress [Clear to Auscultation Bilaterally] : clear to auscultation bilaterally [Normal Gait] : normal gait [Oriented x3] : oriented x3 [Normal Affect] : normal affect [Normal Appearance] : normal appearance [No Acc Muscle Use] : no acc muscle use

## 2024-04-11 ENCOUNTER — NON-APPOINTMENT (OUTPATIENT)
Age: 89
End: 2024-04-11

## 2024-08-06 ENCOUNTER — APPOINTMENT (OUTPATIENT)
Dept: PULMONOLOGY | Facility: CLINIC | Age: 89
End: 2024-08-06

## 2024-08-06 PROCEDURE — G2211 COMPLEX E/M VISIT ADD ON: CPT

## 2024-08-06 PROCEDURE — 99214 OFFICE O/P EST MOD 30 MIN: CPT

## 2024-08-07 NOTE — ASSESSMENT
[FreeTextEntry1] : REVIEWED: [employer] data 5/2021 to 5/2022 74% adherence to more than 4 hours of use per day average use of 6 hours therapeutic AHI of 0.4 minimal leak  PFTs 2/7/2024: FEV1 92, FEV1/FVC 60, , DLCO 78 PSG 3/11/2024: AHI 54.3 (AASM); AHI 41 (CMS); t88 17.6 minutes  88 yo woman, never smoker, with depression and anxiety, hld, and severe JUAREZ following up for JUAREZ and dyspnea.  Sleep: Some daytime somnolence. Efficacy and adherence are at goal on last report however no recent reports for the past year; has an older machine that is now broken beyond repair. PSG this winter re-demonstrated severe JUAREZ. New CPAP machine ordered today through [employer] with pressure settings between 5 and 20 cm H2O and home mask fitting.   Dyspnea: Patient's SOB with exertion and wheezing improved significantly after treatment with Azelastine/Flonase. SOB with exertion and wheezing may be multifactorial including PND and possible asthma/small airways disease as well as weight. PFTs with mild obstruction; patient would like to defer trial of inhaler given improvement with nasal sprays.  Follow-up 10 weeks after starting new PAP machine

## 2024-08-07 NOTE — HISTORY OF PRESENT ILLNESS
[FreeTextEntry1] : 85 yo woman with depression and anxiety, hld, and moderate JUAREZ here for follow up. Perceives great benefit from regular CPAP use. Has been having a hard time with weight loss. Gets nocturnal sciatica pain which wakes her up. Has never tried any formal weight loss programs. Gets regular mask/tubing replacements. Has been considered non compliant by CMS criteria because no F2F available from the initial 90 day compliance period; she was very sick at that time and her CPAP use was not mentioned in her PMD's notes. She is indicated for repeat testing.  9/22/2020 Has been using CPAP with benefit. No issues with mask. Subjective benefit perceived.  4/2/2021 Doing well with CPAP. Benefit perceived.  2/7/2024 No airview data since 2022. She says she uses her CPAP every night and uses it all night. She feels her sleep apnea is well controlled but she feels like she now has occasional wheezing and SOB with exertion. She states this wheezing has been going on for about a month. She says when she is walking with her walker she has to stop to catch her breath after a couple of blocks. Never smoker. History of asthma as a child. 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 Allergies to penicillin and sulfa. Never been on inhalers.  3/26/2024 Had PSG; here to discuss results and for new CPAP machine. Started Flonase/Azelastine and has had major improvement in dyspnea as well as wheeze. Does endorse some daytime somnolence despite PAP compliance. Started Zepbound injection for weight loss; has lost 10 pounds.  8/6/2024 Needs new machine/supplies, machine is broken beyond repair.   DME: [facility] Machine: Airsense 10, CPAP 6 cm H2O Mask: Eson 2 (?)  PMD Dr. Gilda Kilpatrick [phone], [address] [place] [address]. [ESS] : 3

## 2024-08-07 NOTE — PHYSICAL EXAM
[General Appearance - Well Developed] : well developed [General Appearance - Well Nourished] : well nourished [Neck Appearance] : the appearance of the neck was normal [] : no respiratory distress [Exaggerated Use Of Accessory Muscles For Inspiration] : no accessory muscle use [Oriented To Time, Place, And Person] : oriented to person, place, and time [Affect] : the affect was normal [Normal Conjunctiva] : the conjunctiva exhibited no abnormalities [Abnormal Walk] : normal gait [FreeTextEntry1] : ambulates with a walker

## 2024-09-17 NOTE — PATIENT PROFILE ADULT. - NUTRITION PROFILE
Pt arrives to ED A&Ox4, ambulatory at baseline. Pt c/o headache starting tonight after disagreement with coworker. pt states hx. HTN (non compliant with meds). Asthma. Respirations are even and unlabored, abdomen is soft and nondistended, capillary refill is 2 seconds bilaterally. Medicated as per EMAR. Bed in lowest position, comfort measures provided, safety maintained. no indicators present

## 2025-05-08 ENCOUNTER — NON-APPOINTMENT (OUTPATIENT)
Age: 89
End: 2025-05-08

## 2025-05-08 ENCOUNTER — APPOINTMENT (OUTPATIENT)
Dept: PULMONOLOGY | Facility: CLINIC | Age: 89
End: 2025-05-08
Payer: MEDICARE

## 2025-05-08 VITALS
OXYGEN SATURATION: 97 % | SYSTOLIC BLOOD PRESSURE: 126 MMHG | WEIGHT: 255 LBS | HEART RATE: 92 BPM | BODY MASS INDEX: 42.49 KG/M2 | RESPIRATION RATE: 12 BRPM | HEIGHT: 65 IN | DIASTOLIC BLOOD PRESSURE: 74 MMHG | TEMPERATURE: 97.6 F

## 2025-05-08 DIAGNOSIS — G47.33 OBSTRUCTIVE SLEEP APNEA (ADULT) (PEDIATRIC): ICD-10-CM

## 2025-05-08 PROCEDURE — 99214 OFFICE O/P EST MOD 30 MIN: CPT

## 2025-08-13 ENCOUNTER — APPOINTMENT (OUTPATIENT)
Dept: PULMONOLOGY | Facility: CLINIC | Age: 89
End: 2025-08-13
Payer: MEDICARE

## 2025-08-13 VITALS
SYSTOLIC BLOOD PRESSURE: 141 MMHG | HEIGHT: 65 IN | WEIGHT: 255 LBS | BODY MASS INDEX: 42.49 KG/M2 | OXYGEN SATURATION: 96 % | TEMPERATURE: 96 F | HEART RATE: 87 BPM | DIASTOLIC BLOOD PRESSURE: 81 MMHG

## 2025-08-13 DIAGNOSIS — G47.33 OBSTRUCTIVE SLEEP APNEA (ADULT) (PEDIATRIC): ICD-10-CM

## 2025-08-13 PROCEDURE — 99214 OFFICE O/P EST MOD 30 MIN: CPT
